# Patient Record
Sex: FEMALE | Race: BLACK OR AFRICAN AMERICAN | NOT HISPANIC OR LATINO | Employment: FULL TIME | URBAN - METROPOLITAN AREA
[De-identification: names, ages, dates, MRNs, and addresses within clinical notes are randomized per-mention and may not be internally consistent; named-entity substitution may affect disease eponyms.]

---

## 2018-01-31 ENCOUNTER — OFFICE VISIT CONVERTED (OUTPATIENT)
Dept: ORTHOPEDIC SURGERY | Facility: CLINIC | Age: 58
End: 2018-01-31
Attending: PHYSICIAN ASSISTANT

## 2018-04-11 ENCOUNTER — OFFICE VISIT CONVERTED (OUTPATIENT)
Dept: ORTHOPEDIC SURGERY | Facility: CLINIC | Age: 58
End: 2018-04-11
Attending: PHYSICIAN ASSISTANT

## 2018-05-07 ENCOUNTER — CONVERSION ENCOUNTER (OUTPATIENT)
Dept: ORTHOPEDIC SURGERY | Facility: CLINIC | Age: 58
End: 2018-05-07

## 2018-05-07 ENCOUNTER — OFFICE VISIT CONVERTED (OUTPATIENT)
Dept: ORTHOPEDIC SURGERY | Facility: CLINIC | Age: 58
End: 2018-05-07
Attending: ORTHOPAEDIC SURGERY

## 2018-06-04 ENCOUNTER — OFFICE VISIT CONVERTED (OUTPATIENT)
Dept: ORTHOPEDIC SURGERY | Facility: CLINIC | Age: 58
End: 2018-06-04
Attending: ORTHOPAEDIC SURGERY

## 2018-07-02 ENCOUNTER — OFFICE VISIT CONVERTED (OUTPATIENT)
Dept: ORTHOPEDIC SURGERY | Facility: CLINIC | Age: 58
End: 2018-07-02
Attending: ORTHOPAEDIC SURGERY

## 2018-08-01 ENCOUNTER — OFFICE VISIT CONVERTED (OUTPATIENT)
Dept: ORTHOPEDIC SURGERY | Facility: CLINIC | Age: 58
End: 2018-08-01
Attending: PHYSICIAN ASSISTANT

## 2018-09-10 ENCOUNTER — OFFICE VISIT CONVERTED (OUTPATIENT)
Dept: ORTHOPEDIC SURGERY | Facility: CLINIC | Age: 58
End: 2018-09-10
Attending: ORTHOPAEDIC SURGERY

## 2018-12-10 ENCOUNTER — CONVERSION ENCOUNTER (OUTPATIENT)
Dept: ORTHOPEDIC SURGERY | Facility: CLINIC | Age: 58
End: 2018-12-10

## 2018-12-10 ENCOUNTER — OFFICE VISIT CONVERTED (OUTPATIENT)
Dept: ORTHOPEDIC SURGERY | Facility: CLINIC | Age: 58
End: 2018-12-10
Attending: ORTHOPAEDIC SURGERY

## 2019-04-08 ENCOUNTER — OFFICE VISIT CONVERTED (OUTPATIENT)
Dept: ORTHOPEDIC SURGERY | Facility: CLINIC | Age: 59
End: 2019-04-08
Attending: ORTHOPAEDIC SURGERY

## 2019-04-08 ENCOUNTER — CONVERSION ENCOUNTER (OUTPATIENT)
Dept: ORTHOPEDIC SURGERY | Facility: CLINIC | Age: 59
End: 2019-04-08

## 2019-06-10 ENCOUNTER — OFFICE VISIT CONVERTED (OUTPATIENT)
Dept: ORTHOPEDIC SURGERY | Facility: CLINIC | Age: 59
End: 2019-06-10
Attending: ORTHOPAEDIC SURGERY

## 2019-08-19 ENCOUNTER — CONVERSION ENCOUNTER (OUTPATIENT)
Dept: ORTHOPEDIC SURGERY | Facility: CLINIC | Age: 59
End: 2019-08-19

## 2019-08-19 ENCOUNTER — OFFICE VISIT CONVERTED (OUTPATIENT)
Dept: ORTHOPEDIC SURGERY | Facility: CLINIC | Age: 59
End: 2019-08-19
Attending: ORTHOPAEDIC SURGERY

## 2019-09-16 ENCOUNTER — OFFICE VISIT CONVERTED (OUTPATIENT)
Dept: ORTHOPEDIC SURGERY | Facility: CLINIC | Age: 59
End: 2019-09-16
Attending: ORTHOPAEDIC SURGERY

## 2020-01-13 ENCOUNTER — OFFICE VISIT CONVERTED (OUTPATIENT)
Dept: ORTHOPEDIC SURGERY | Facility: CLINIC | Age: 60
End: 2020-01-13
Attending: ORTHOPAEDIC SURGERY

## 2020-09-21 ENCOUNTER — OFFICE VISIT CONVERTED (OUTPATIENT)
Dept: ORTHOPEDIC SURGERY | Facility: CLINIC | Age: 60
End: 2020-09-21
Attending: ORTHOPAEDIC SURGERY

## 2020-11-02 ENCOUNTER — OFFICE VISIT CONVERTED (OUTPATIENT)
Dept: ORTHOPEDIC SURGERY | Facility: CLINIC | Age: 60
End: 2020-11-02
Attending: ORTHOPAEDIC SURGERY

## 2021-03-01 ENCOUNTER — OFFICE VISIT CONVERTED (OUTPATIENT)
Dept: ORTHOPEDIC SURGERY | Facility: CLINIC | Age: 61
End: 2021-03-01
Attending: ORTHOPAEDIC SURGERY

## 2021-03-01 ENCOUNTER — CONVERSION ENCOUNTER (OUTPATIENT)
Dept: ORTHOPEDIC SURGERY | Facility: CLINIC | Age: 61
End: 2021-03-01

## 2021-05-05 ENCOUNTER — CONVERSION ENCOUNTER (OUTPATIENT)
Dept: ORTHOPEDIC SURGERY | Facility: CLINIC | Age: 61
End: 2021-05-05

## 2021-05-13 NOTE — PROGRESS NOTES
Progress Note      Patient Name: Jessika Mccann   Patient ID: [id number]   Sex: Female   YOB: 1960        Visit Date: September 21, 2020    Provider: Joshua Forrest MD   Location: [facility]   Location Address: [address] [place] [address]  [id number]   Location Phone: [phone]          Chief Complaint  · Left Knee Osteoarthritis      History Of Present Illness  Jessika Mccann is a 59 year old /Black female who presents today to [facility].      The patient presents here today for follow up evaluation of her left knee, Left Knee osteoarthritis.  Patient has a history of left knee arthroscopic partial medial meniscectomy, chondroplasty of medial femoral condyle, chondroplasty of the patella on 07/09/2018. The patient had a previous steroid injection preformed by Dr. Forrest in January 2020 in her left knee after seeing Dr. Brown for a second opinion. She is trying to hold off on a left total knee arthroplasty.  She is here today for a repeat injection in her left knee.       Past Medical History  Aftercare Following Left Knee Partial Medial Meniscectomy and Chondroplasty 07/19/2018; Anemia, Unspecified; Arthritis; Asthma; Diabetes; Heart Disease; Hyperlipemia; Hyperlipidemia; Hypertension; Left knee patella tendonitis; Limb Swelling; Medial meniscus tear, Left; Primary osteoarthritis of left knee; Reflux; Seasonal allergies; Shortness of Breath; Thyroid disorder         Past Surgical History  *Other; Carpal Tunnel Release; Colonoscopy; EAR Surgery; Heart Valves         Medication List  Aspir-81 81 mg oral tablet,delayed release (DR/EC); esomeprazole magnesium 40 mg oral capsule,delayed release(DR/EC); levothyroxine 100 mcg oral tablet; loratadine 10 mg oral tablet; metoprolol succinate 25 mg oral tablet extended release 24 hr; simvastatin 40 mg oral tablet; tramadol 50 mg oral tablet         Allergy List  NO KNOWN DRUG ALLERGIES       Allergies  "Reconciled  Family Medical History  Stroke; Heart Disease; Cancer, Unspecified; Diabetes, unspecified type; Family history of certain chronic disabling diseases; arthritis; Osteoporosis; Family history of Arthritis         Social History  Alcohol Use (Current some day); lives alone; lives with other; Recreational Drug Use (Never); Single.; Tobacco (Never); Working         Review of Systems  · Constitutional  o Denies  o : fever, chills, weight loss  · Cardiovascular  o Denies  o : chest pain, shortness of breath  · Gastrointestinal  o Denies  o : liver disease, heartburn, nausea, blood in stools  · Genitourinary  o Denies  o : painful urination, blood in urine  · Integument  o Denies  o : rash, itching  · Neurologic  o Denies  o : headache, weakness, loss of consciousness  · Musculoskeletal  o Denies  o : painful, swollen joints  · Psychiatric  o Denies  o : drug/alcohol addiction, anxiety, depression      Vitals  Date Time BP Position Site L\R Cuff Size HR RR TEMP (F) WT  HT  BMI kg/m2 BSA m2 O2 Sat        09/21/2020 09:27 AM         184lbs 16oz 5'  1.5\" 34.39 1.91           Physical Examination  · Constitutional  o Appearance  o : well developed, well-nourished, no obvious deformities present  · Head and Face  o Head  o :   § Inspection  § : normocephalic  o Face  o :   § Inspection  § : no facial lesions  · Eyes  o Conjunctivae  o : conjunctivae normal  o Sclerae  o : sclerae white  · Ears, Nose, Mouth and Throat  o Ears  o :   § External Ears  § : appearance within normal limits  § Hearing  § : intact  o Nose  o :   § External Nose  § : appearance normal  · Neck  o Inspection/Palpation  o : normal appearance  o Range of Motion  o : full range of motion  · Respiratory  o Respiratory Effort  o : breathing unlabored  o Inspection of Chest  o : normal appearance  o Auscultation of Lungs  o : no audible wheezing or rales  · Cardiovascular  o Heart  o : regular rate  · Gastrointestinal  o Abdominal Examination  o : " soft and non-tender  · Skin and Subcutaneous Tissue  o General Inspection  o : intact, no rashes  · Psychiatric  o General  o : Alert and oriented x3  o Judgement and Insight  o : judgment and insight intact  o Mood and Affect  o : mood normal, affect appropriate  · Left Knee  o Inspection  o : She has no skin discoloration, atrophy or swelling. Palpable tenderness over the medial and lateral joint line. She has full extension. Full flexion. Positive crepitus. Calf supple, non-tender. Neurovascularly and sensation grossly intact. X-rays showed advanced osteoarthritis of the left knee.  · Injection Note/Aspiration Note  o Site  o : left knee   o Procedure  o : Procedure: After educating the patient, patient gave consent for procedure. After using Chloraprep, the joint space was injected. The patient tolerated the procedure well.   o Medication  o : 80 mg of DepoMedrol with 9cc of 1% Lidocaine          Assessment  · Primary osteoarthritis of left knee     715.16/M17.12      Plan  · Orders  o Depo-Medrol injection 80mg () - 715.16/M17.12 - 09/21/2020   Lot [id number] Exp 07 2021 [employer] Administered by GREGG Forrest MD  o Knee Intra-articular Injection without US Guidance [facility] ([id number]) - 715.16/M17.12 - 09/21/2020   Lot [id number] Exp 08 01 2021 [employer] Administered by GREGG Forrest MD  · Medications  o Medications have been Reconciled  o Transition of Care or Provider Policy  · Instructions  o X-rays reviewed by Dr. Forrest.  o Reviewed the patient's Past Medical, Social, and Family history as well as the ROS at today's visit, no changes.  o Call or return if worsening symptoms.  o Follow up in 6 weeks.  o This note was transcribed by Anjali Fung. netta rosales Discussed the risks and benefits of a steroid injection in the left knee with the patient. The patient expressed understanding and wished to proceed with the injection. She tolerated the injection well. Follow up in 6 weeks to recheck.   o Electronically  Identified Patient Education Materials Provided Electronically            Electronically Signed by: Anjali Fung MA -Author on September 23, 2020 11:01:30 AM  Electronically Co-signed by: Joshua Forrest MD -Reviewer on September 24, 2020 05:19:12 PM

## 2021-05-13 NOTE — PROGRESS NOTES
Progress Note      Patient Name: Jessika Mccann   Patient ID: [id number]   Sex: Female   YOB: 1960    Referring Provider: Joshua Forrest MD    Visit Date: November 2, 2020    Provider: Joshua Forrest MD   Location[facility]   Location Address: [address] [place] [address]  [id number]   Location Phone: [phone]          Chief Complaint  · Left Knee Pain      History Of Present Illness  Jessika Mccann is a 60 year old /Black female who presents today to [facility].      Patient presents today with a follow-up of left knee pain. Patient has a history of left knee osteoarthritis. Patient has a history of left knee arthroscopic partial medial meniscectomy, chondroplasty of medial femoral condyle, chondroplasty of the patella on 07/09/2018. The patient had a previous steroid injection performed by Dr. Forrest in 9/21/20 in her left knee. Patient states the injections aren't giving her any relief of pain. She has been trying to hold off on a left total knee arthroplasty.       Past Medical History  Aftercare Following Left Knee Partial Medial Meniscectomy and Chondroplasty 07/19/2018; Anemia, Unspecified; Arthritis; Asthma; Diabetes; Heart Disease; Hyperlipemia; Hyperlipidemia; Hypertension; Left knee patella tendonitis; Limb Swelling; Medial meniscus tear, Left; Primary osteoarthritis of left knee; Reflux; Seasonal allergies; Shortness of Breath; Thyroid disorder         Past Surgical History  *Other; Carpal Tunnel Release; Colonoscopy; EAR Surgery; Heart Valves         Medication List  Aspir-81 81 mg oral tablet,delayed release (DR/EC); esomeprazole magnesium 40 mg oral capsule,delayed release(DR/EC); levothyroxine 100 mcg oral tablet; loratadine 10 mg oral tablet; metoprolol succinate 25 mg oral tablet extended release 24 hr; simvastatin 40 mg oral tablet; tramadol 50 mg oral tablet         Allergy List  NO KNOWN DRUG ALLERGIES       Allergies  "Reconciled  Family Medical History  Stroke; Heart Disease; Cancer, Unspecified; Diabetes, unspecified type; Family history of certain chronic disabling diseases; arthritis; Osteoporosis; Family history of Arthritis         Social History  Alcohol Use (Current some day); lives alone; lives with other; Recreational Drug Use (Never); Single.; Tobacco (Never); Working         Review of Systems  · Constitutional  o Denies  o : fever, chills, weight loss  · Cardiovascular  o Denies  o : chest pain, shortness of breath  · Gastrointestinal  o Denies  o : liver disease, heartburn, nausea, blood in stools  · Genitourinary  o Denies  o : painful urination, blood in urine  · Integument  o Denies  o : rash, itching  · Neurologic  o Denies  o : headache, weakness, loss of consciousness  · Musculoskeletal  o Denies  o : painful, swollen joints  · Psychiatric  o Denies  o : drug/alcohol addiction, anxiety, depression      Vitals  Date Time BP Position Site L\R Cuff Size HR RR TEMP (F) WT  HT  BMI kg/m2 BSA m2 O2 Sat FR L/min FiO2        11/02/2020 08:29 AM      67 - R   187lbs 0oz 5'  1.5\" 34.76 1.92 97 %            Physical Examination  · Constitutional  o Appearance  o : well developed, well-nourished, no obvious deformities present  · Head and Face  o Head  o :   § Inspection  § : normocephalic  o Face  o :   § Inspection  § : no facial lesions  · Eyes  o Conjunctivae  o : conjunctivae normal  o Sclerae  o : sclerae white  · Ears, Nose, Mouth and Throat  o Ears  o :   § External Ears  § : appearance within normal limits  § Hearing  § : intact  o Nose  o :   § External Nose  § : appearance normal  · Neck  o Inspection/Palpation  o : normal appearance  o Range of Motion  o : full range of motion  · Respiratory  o Respiratory Effort  o : breathing unlabored  o Inspection of Chest  o : normal appearance  o Auscultation of Lungs  o : no audible wheezing or rales  · Cardiovascular  o Heart  o : regular " rate  · Gastrointestinal  o Abdominal Examination  o : soft and non-tender  · Skin and Subcutaneous Tissue  o General Inspection  o : intact, no rashes  · Psychiatric  o General  o : Alert and oriented x3  o Judgement and Insight  o : judgment and insight intact  o Mood and Affect  o : mood normal, affect appropriate  · Left Knee  o Inspection  o : Sensation grossly intact. Neurovascular intact. Pulses are pleasant. Tenderness over the medial and lateral joint line. Full extension. Full flexion. No skin discoloration, atrophy or swelling. Crepitus present. Calf supple, non-tender. Full weight-bearing. Skin intact. Antalgic gait.   · In Office Procedures  o View  o : LAT/SUNRISE/STANDING   o Site  o : left, knee  o Indication  o : Left knee pain   o Study  o : X-rays ordered, taken in the office, and reviewed today.  o Xray  o : Negative signs for fracture or dislocation. Advanced degenerative changes consistent with osteoarthritis.           Assessment  · Primary osteoarthritis of left knee     715.16/M17.12  · Left knee pain, unspecified chronicity     719.46/M25.562      Plan  · Orders  o Knee (Left) [facility]erred View ([id number]) - 719.46/M25.562 - 11/02/2020  · Medications  o Medications have been Reconciled  o Transition of Care or Provider Policy  · Instructions  o Dr. Forrest saw and examined the patient and agrees with plan.   o X-rays reviewed by Dr. Forrest.  o Reviewed the patient's Past Medical, Social, and Family history as well as the ROS at today's visit, no changes.  o Call or return if worsening symptoms.  o Follow Up PRN.  o This note was transcribed by Lisa Calderon. ruiz  o Discussed diagnosis and treatment options with the patient. Discussed left total knee replacement. Patient wants to hold off on left total knee replacement for the time being and wants to lose some more weight.            Electronically Signed by: Lisa Calderon-, Other -Author on November 5, 2020 09:01:23  AM  Electronically Co-signed by: Joshua Forrest MD -Reviewer on November 5, 2020 08:14:48 PM

## 2021-05-14 VITALS — BODY MASS INDEX: 34.36 KG/M2 | HEIGHT: 61 IN | OXYGEN SATURATION: 97 % | HEART RATE: 64 BPM | WEIGHT: 182 LBS

## 2021-05-14 VITALS — WEIGHT: 185 LBS | HEIGHT: 61 IN | BODY MASS INDEX: 34.93 KG/M2

## 2021-05-14 VITALS — HEART RATE: 67 BPM | BODY MASS INDEX: 35.3 KG/M2 | OXYGEN SATURATION: 97 % | HEIGHT: 61 IN | WEIGHT: 187 LBS

## 2021-05-14 NOTE — PROGRESS NOTES
Progress Note      Patient Name: Jessika Mccann   Patient ID: [id number]   Sex: Female   YOB: 1960        Visit Date: March 1, 2021    Provider: Joshua Forrest MD   Location: [facility]   Location Address: [address] [place] [address]  [id number]   Location Phone: [phone]          Chief Complaint  · Left Knee Osteoarthritis      History Of Present Illness  Jessika Mccann is a 60 year old /Black female who presents today to [facility].      Patient presents today with a follow-up of left knee osteoarthritis. Patient has a history of left knee arthroscopic partial medial meniscectomy, chondroplasty of medial femoral condyle, chondroplasty of the patella on 07/09/2018. The patient had a previous steroid injection performed by Dr. Forrest in 9/21/20 in her left knee. Patient states the injections aren't giving her any relief of pain. She has been trying to hold off on a left total knee arthroplasty.       Past Medical History  Aftercare Following Left Knee Partial Medial Meniscectomy and Chondroplasty 07/19/2018; Anemia, Unspecified; Arthritis; Asthma; Diabetes; Heart Disease; Hyperlipemia; Hyperlipidemia; Hypertension; Left knee patella tendonitis; Limb Swelling; Medial meniscus tear, Left; Primary osteoarthritis of left knee; Reflux; Seasonal allergies; Shortness of Breath; Thyroid disorder         Past Surgical History  *Other; Carpal Tunnel Release; Colonoscopy; EAR Surgery; Heart Valves         Medication List  Aspir-81 81 mg oral tablet,delayed release (DR/EC); esomeprazole magnesium 40 mg oral capsule,delayed release(DR/EC); levothyroxine 100 mcg oral tablet; loratadine 10 mg oral tablet; metoprolol succinate 25 mg oral tablet extended release 24 hr; simvastatin 40 mg oral tablet; tramadol 50 mg oral tablet         Allergy List  NO KNOWN DRUG ALLERGIES       Allergies Reconciled  Family Medical History  Stroke; Heart Disease; Cancer, Unspecified;  "Diabetes, unspecified type; Family history of certain chronic disabling diseases; arthritis; Osteoporosis; Family history of Arthritis         Social History  Alcohol Use (Current some day); lives alone; lives with other; Recreational Drug Use (Never); Single.; Tobacco (Never); Working         Review of Systems  · Constitutional  o Denies  o : fever, chills, weight loss  · Cardiovascular  o Denies  o : chest pain, shortness of breath  · Gastrointestinal  o Denies  o : liver disease, heartburn, nausea, blood in stools  · Genitourinary  o Denies  o : painful urination, blood in urine  · Integument  o Denies  o : rash, itching  · Neurologic  o Denies  o : headache, weakness, loss of consciousness  · Musculoskeletal  o Denies  o : painful, swollen joints  · Psychiatric  o Denies  o : drug/alcohol addiction, anxiety, depression      Vitals  Date Time BP Position Site L\R Cuff Size HR RR TEMP (F) WT  HT  BMI kg/m2 BSA m2 O2 Sat FR L/min FiO2        03/01/2021 09:04 AM      64 - R   182lbs 0oz 5'  1.5\" 33.83 1.89 97 %            Physical Examination  · Constitutional  o Appearance  o : well developed, well-nourished, no obvious deformities present  · Head and Face  o Head  o :   § Inspection  § : normocephalic  o Face  o :   § Inspection  § : no facial lesions  · Eyes  o Conjunctivae  o : conjunctivae normal  o Sclerae  o : sclerae white  · Ears, Nose, Mouth and Throat  o Ears  o :   § External Ears  § : appearance within normal limits  § Hearing  § : intact  o Nose  o :   § External Nose  § : appearance normal  · Neck  o Inspection/Palpation  o : normal appearance  o Range of Motion  o : full range of motion  · Respiratory  o Respiratory Effort  o : breathing unlabored  o Inspection of Chest  o : normal appearance  o Auscultation of Lungs  o : no audible wheezing or rales  · Cardiovascular  o Heart  o : regular rate  · Gastrointestinal  o Abdominal Examination  o : soft and non-tender  · Skin and Subcutaneous " Tissue  o General Inspection  o : intact, no rashes  · Psychiatric  o General  o : Alert and oriented x3  o Judgement and Insight  o : judgment and insight intact  o Mood and Affect  o : mood normal, affect appropriate  · Left Knee  o Inspection  o : Sensation grossly intact. Neurovascular intact. Pulses are pleasant. Tenderness over the medial and lateral joint line. Full extension. Full flexion. No skin discoloration, atrophy or swelling. Crepitus present. Calf supple, non-tender. Full weight-bearing. Skin intact. Antalgic gait.   · Injection Note/Aspiration Note  o Site  o : left knee   o Procedure  o : Procedure: After educating the patient, patient gave consent for procedure. After using Chloraprep, the joint space was injected. The patient tolerated the procedure well.   o Medication  o : 80 mg of DepoMedrol with 9cc of 1% Lidocaine          Assessment  · Primary osteoarthritis of left knee     715.16/M17.12      Plan  · Orders  o Depo-Medrol injection 80mg () - 715.16/M17.12 - 03/01/2021   Lot [id number] Exp 01 2022 [employer] Administered by GREGG Forrest MD  o Knee Intra-articular Injection without US Guidance [facility] ([id number]) - 715.16/M17.12 - 03/01/2021   Lot [id number] 03 01 2022 [employer] Administered by GREGG Forrest MD  · Medications  o Medications have been Reconciled  o Transition of Care or Provider Policy  · Instructions  o Dr. Forrest saw and examined the patient and agrees with plan.   o Reviewed the patient's Past Medical, Social, and Family history as well as the ROS at today's visit, no changes.  o Call or return if worsening symptoms.  o Follow Up PRN.  o The above service was scribed by Lisa Calderon on my behalf and I attest to the accuracy of the note. ruiz   o Discussed diagnosis and treatment options with the patient. Patient opted for a left knee injection and tolerated it well. She is holding off on a left knee replacement.            Electronically Signed by: Lisa Calderon-,  Other -Author on March 4, 2021 09:17:52 AM  Electronically Co-signed by: Joshua Forrest MD -Reviewer on March 7, 2021 09:47:29 AM

## 2021-05-15 VITALS — OXYGEN SATURATION: 97 % | WEIGHT: 188.25 LBS | HEART RATE: 86 BPM | BODY MASS INDEX: 35.54 KG/M2 | HEIGHT: 61 IN

## 2021-05-15 VITALS — HEIGHT: 61 IN | WEIGHT: 181 LBS | BODY MASS INDEX: 34.17 KG/M2

## 2021-05-15 VITALS — OXYGEN SATURATION: 98 % | HEART RATE: 72 BPM | BODY MASS INDEX: 34.17 KG/M2 | WEIGHT: 181 LBS | HEIGHT: 61 IN

## 2021-05-15 VITALS — HEIGHT: 61 IN | BODY MASS INDEX: 35.56 KG/M2 | OXYGEN SATURATION: 98 % | HEART RATE: 84 BPM | WEIGHT: 188.37 LBS

## 2021-05-15 VITALS — HEART RATE: 101 BPM | BODY MASS INDEX: 33.99 KG/M2 | OXYGEN SATURATION: 99 % | WEIGHT: 180 LBS | HEIGHT: 61 IN

## 2021-05-16 VITALS — HEIGHT: 61 IN | WEIGHT: 173 LBS | BODY MASS INDEX: 32.66 KG/M2 | OXYGEN SATURATION: 97 % | HEART RATE: 71 BPM

## 2021-05-16 VITALS — HEIGHT: 61 IN | HEART RATE: 84 BPM | OXYGEN SATURATION: 96 % | BODY MASS INDEX: 35.87 KG/M2 | WEIGHT: 190 LBS

## 2021-05-16 VITALS — HEART RATE: 84 BPM | WEIGHT: 185 LBS | OXYGEN SATURATION: 98 % | HEIGHT: 61 IN | BODY MASS INDEX: 34.93 KG/M2

## 2021-05-16 VITALS — HEIGHT: 61 IN | HEART RATE: 82 BPM | OXYGEN SATURATION: 98 % | WEIGHT: 175 LBS | BODY MASS INDEX: 33.04 KG/M2

## 2021-05-16 VITALS — BODY MASS INDEX: 33.49 KG/M2 | WEIGHT: 177.37 LBS | OXYGEN SATURATION: 98 % | HEIGHT: 61 IN | HEART RATE: 99 BPM

## 2021-05-16 VITALS — HEIGHT: 61 IN | WEIGHT: 173 LBS | BODY MASS INDEX: 32.66 KG/M2

## 2021-05-16 VITALS — WEIGHT: 179 LBS | OXYGEN SATURATION: 97 % | BODY MASS INDEX: 33.79 KG/M2 | HEART RATE: 72 BPM | HEIGHT: 61 IN

## 2021-05-16 VITALS — BODY MASS INDEX: 32.66 KG/M2 | HEIGHT: 61 IN | WEIGHT: 173 LBS

## 2021-07-15 VITALS — BODY MASS INDEX: 31.51 KG/M2 | HEART RATE: 80 BPM | HEIGHT: 65 IN | OXYGEN SATURATION: 97 % | WEIGHT: 189.12 LBS

## 2021-11-08 ENCOUNTER — OFFICE VISIT (OUTPATIENT)
Dept: ORTHOPEDIC SURGERY | Facility: CLINIC | Age: 61
End: 2021-11-08

## 2021-11-08 VITALS — WEIGHT: 190 LBS | HEART RATE: 67 BPM | BODY MASS INDEX: 31.65 KG/M2 | HEIGHT: 65 IN | OXYGEN SATURATION: 97 %

## 2021-11-08 DIAGNOSIS — M25.562 LEFT KNEE PAIN, UNSPECIFIED CHRONICITY: ICD-10-CM

## 2021-11-08 DIAGNOSIS — M17.12 PRIMARY OSTEOARTHRITIS OF LEFT KNEE: Primary | ICD-10-CM

## 2021-11-08 PROCEDURE — 20610 DRAIN/INJ JOINT/BURSA W/O US: CPT | Performed by: ORTHOPAEDIC SURGERY

## 2021-11-08 RX ORDER — VERAPAMIL HYDROCHLORIDE 300 MG/1
CAPSULE, EXTENDED RELEASE ORAL
COMMUNITY
Start: 2021-10-25

## 2021-11-08 RX ORDER — MECLIZINE HYDROCHLORIDE 25 MG/1
TABLET ORAL
COMMUNITY
Start: 2021-09-06

## 2021-11-08 RX ORDER — LORATADINE 10 MG/1
TABLET ORAL
COMMUNITY

## 2021-11-08 RX ORDER — ASPIRIN 81 MG/1
TABLET ORAL
COMMUNITY

## 2021-11-08 RX ORDER — FLUTICASONE PROPIONATE 50 MCG
SPRAY, SUSPENSION (ML) NASAL
COMMUNITY
Start: 2021-09-13

## 2021-11-08 RX ORDER — METOPROLOL SUCCINATE 25 MG/1
TABLET, EXTENDED RELEASE ORAL
COMMUNITY

## 2021-11-08 RX ORDER — SIMVASTATIN 40 MG
TABLET ORAL
COMMUNITY

## 2021-11-08 RX ORDER — LEVOTHYROXINE SODIUM 0.1 MG/1
100 TABLET ORAL DAILY
COMMUNITY
Start: 2021-10-08

## 2021-11-08 RX ORDER — CITALOPRAM 20 MG/1
TABLET ORAL
COMMUNITY
Start: 2021-09-02

## 2021-11-08 RX ORDER — ESOMEPRAZOLE MAGNESIUM 40 MG/1
CAPSULE, DELAYED RELEASE ORAL
COMMUNITY

## 2021-11-08 RX ORDER — ALBUTEROL SULFATE 90 UG/1
2 AEROSOL, METERED RESPIRATORY (INHALATION) SEE ADMIN INSTRUCTIONS
COMMUNITY
Start: 2021-08-30

## 2021-11-08 RX ADMIN — LIDOCAINE HYDROCHLORIDE 9 ML: 10 INJECTION, SOLUTION INFILTRATION; PERINEURAL at 09:37

## 2021-11-08 RX ADMIN — TRIAMCINOLONE ACETONIDE 40 MG: 40 INJECTION, SUSPENSION INTRA-ARTICULAR; INTRAMUSCULAR at 09:37

## 2021-11-08 NOTE — PROGRESS NOTES
"Chief Complaint  Pain of the Left Knee     Subjective      Jessika Mccann presents to [facility] for an evaluation of left knee. Patient has left knee osteoarthritis that she has been treating conservatively. She states that she wants to do a knee replacement in the Spring. She has had injections in the past that has given her relief. She denies any recent injury to the knee.     No Known Allergies     Social History     Socioeconomic History   • Marital status: Single   Tobacco Use   • Smoking status: Never Smoker   • Smokeless tobacco: Never Used        Review of Systems     Objective   Vital Signs:   Pulse 67   Ht 165.1 cm (65\")   Wt 86.2 kg (190 lb)   SpO2 97%   BMI 31.62 kg/m²       Physical Exam  Constitutional:       Appearance: Normal appearance. Patient is well-developed and normal weight.   HENT:      Head: Normocephalic.      Right Ear: Hearing and external ear normal.      Left Ear: Hearing and external ear normal.      Nose: Nose normal.   Eyes:      Conjunctiva/sclera: Conjunctivae normal.   Cardiovascular:      Rate and Rhythm: Normal rate.   Pulmonary:      Effort: Pulmonary effort is normal.      Breath sounds: No wheezing or rales.   Abdominal:      Palpations: Abdomen is soft.      Tenderness: There is no abdominal tenderness.   Musculoskeletal:      Cervical back: Normal range of motion.   Skin:     Findings: No rash.   Neurological:      Mental Status: Patient  is alert and oriented to person, place, and time.   Psychiatric:         Mood and Affect: Mood and affect normal.         Judgment: Judgment normal.       Ortho Exam      LEFT KNEE: positive crepitus. Palpable osteophytes. No swelling, skin discoloration or atrophy. Skin intact. Tender medial and lateral joint line. Full extension. Stable to varus/valgus stress. Negative Lachman. Sensation grossly intact. Neurovascular intact.  Dorsal Pedal Pulse 2+, posterior tibialis pulse 2+. Calf supple, non-tender, " no signs of DVT. Full weight bearing. Limping gait.       Large Joint Arthrocentesis: L knee  Date/Time: 11/8/2021 9:37 AM  Consent given by: patient  Site marked: site marked  Timeout: Immediately prior to procedure a time out was called to verify the correct patient, procedure, equipment, support staff and site/side marked as required   Supporting Documentation  Indications: pain   Procedure Details  Location: knee - L knee  Preparation: Patient was prepped and draped in the usual sterile fashion  Needle size: 22 G  Medications administered: 9 mL lidocaine 1 %; 40 mg triamcinolone acetonide 40 MG/ML  Patient tolerance: patient tolerated the procedure well with no immediate complications              Imaging Results (Most Recent)     Procedure Component Value Units Date/Time    XR Knee 3 View Left [[id number]] Resulted: 11/08/21 0910     Updated: 11/08/21 0917           Result Review :     X-Ray Report:  Left knee(s) X-Ray  Indication: Evaluation of left knee pain   AP, Lateral and Standing view(s)  Findings: Moderately advanced degenerative changes. No fracture or dislocation. Bone spurring in the medial compartment.   Prior studies available for comparison: no     Assessment and Plan     DX: Left knee osteoarthritis     Patient wants to proceed with a left total knee arthroplasty in the spring time. She wishes to continue conservative management. A left knee injection given, she tolerated this well.     Call or return if worsening symptoms.    Follow Up     PRN.       Patient was given instructions and counseling regarding her condition or for health maintenance advice. Please see specific information pulled into the AVS if appropriate.     Scribed for Joshua Forrest MD by Lisa Calderon.  11/08/21   09:19 EST    I have personally performed the services described in this document as scribed by the above individual and it is both accurate and complete. Joshua Forrest MD 11/10/21

## 2021-11-10 RX ORDER — TRIAMCINOLONE ACETONIDE 40 MG/ML
40 INJECTION, SUSPENSION INTRA-ARTICULAR; INTRAMUSCULAR
Status: COMPLETED | OUTPATIENT
Start: 2021-11-08 | End: 2021-11-08

## 2021-11-10 RX ORDER — LIDOCAINE HYDROCHLORIDE 10 MG/ML
9 INJECTION, SOLUTION INFILTRATION; PERINEURAL
Status: COMPLETED | OUTPATIENT
Start: 2021-11-08 | End: 2021-11-08

## 2022-06-23 ENCOUNTER — TELEPHONE (OUTPATIENT)
Dept: ORTHOPEDIC SURGERY | Facility: CLINIC | Age: 62
End: 2022-06-23

## 2022-06-23 NOTE — TELEPHONE ENCOUNTER
PT CAME IN TO ASK IF HER PAPERS THAT SHE FAXED -[phone] WERE RECEIVED. PT ASKING FOR A CALL BACK -[phone] TO CONFIRM THAT WE RECEIVED THEM.

## 2022-08-30 ENCOUNTER — TRANSCRIBE ORDERS (OUTPATIENT)
Dept: ADMINISTRATIVE | Facility: HOSPITAL | Age: 62
End: 2022-08-30

## 2022-08-30 DIAGNOSIS — N60.11 FIBROCYSTIC BREAST CHANGES, BILATERAL: Primary | ICD-10-CM

## 2022-08-30 DIAGNOSIS — N60.12 FIBROCYSTIC BREAST CHANGES, BILATERAL: Primary | ICD-10-CM

## 2022-08-31 ENCOUNTER — TRANSCRIBE ORDERS (OUTPATIENT)
Dept: ADMINISTRATIVE | Facility: HOSPITAL | Age: 62
End: 2022-08-31

## 2022-08-31 DIAGNOSIS — N63.20 MASS OF LEFT BREAST, UNSPECIFIED QUADRANT: Primary | ICD-10-CM

## 2022-09-15 ENCOUNTER — HOSPITAL ENCOUNTER (OUTPATIENT)
Dept: ULTRASOUND IMAGING | Facility: HOSPITAL | Age: 62
Discharge: HOME | End: 2022-09-15

## 2022-09-15 ENCOUNTER — HOSPITAL ENCOUNTER (OUTPATIENT)
Dept: MAMMOGRAPHY | Facility: HOSPITAL | Age: 62
Discharge: HOME | End: 2022-09-15

## 2022-09-15 DIAGNOSIS — N60.11 FIBROCYSTIC BREAST CHANGES, BILATERAL: ICD-10-CM

## 2022-09-15 DIAGNOSIS — N60.12 FIBROCYSTIC BREAST CHANGES, BILATERAL: ICD-10-CM

## 2022-09-15 PROCEDURE — 76642 ULTRASOUND BREAST LIMITED: CPT

## 2022-09-15 PROCEDURE — G0279 TOMOSYNTHESIS, MAMMO: HCPCS

## 2022-09-15 PROCEDURE — 77066 DX MAMMO INCL CAD BI: CPT

## 2022-10-10 ENCOUNTER — APPOINTMENT (OUTPATIENT)
Dept: MAMMOGRAPHY | Facility: HOSPITAL | Age: 62
End: 2022-10-10

## 2023-02-02 ENCOUNTER — TRANSCRIBE ORDERS (OUTPATIENT)
Dept: ADMINISTRATIVE | Facility: HOSPITAL | Age: 63
End: 2023-02-02
Payer: COMMERCIAL

## 2023-02-02 DIAGNOSIS — Z12.31 VISIT FOR SCREENING MAMMOGRAM: Primary | ICD-10-CM

## 2023-03-23 ENCOUNTER — HOSPITAL ENCOUNTER (OUTPATIENT)
Dept: MAMMOGRAPHY | Facility: HOSPITAL | Age: 63
Discharge: HOME | End: 2023-03-23
Payer: COMMERCIAL

## 2023-03-23 DIAGNOSIS — Z12.31 VISIT FOR SCREENING MAMMOGRAM: ICD-10-CM

## 2023-05-05 ENCOUNTER — TRANSCRIBE ORDERS (OUTPATIENT)
Dept: ADMINISTRATIVE | Facility: HOSPITAL | Age: 63
End: 2023-05-05
Payer: COMMERCIAL

## 2023-05-05 DIAGNOSIS — Z12.31 VISIT FOR SCREENING MAMMOGRAM: Primary | ICD-10-CM

## 2023-10-02 ENCOUNTER — OFFICE VISIT (OUTPATIENT)
Dept: ORTHOPEDIC SURGERY | Facility: CLINIC | Age: 63
End: 2023-10-02
Payer: COMMERCIAL

## 2023-10-02 VITALS
SYSTOLIC BLOOD PRESSURE: 148 MMHG | HEIGHT: 65 IN | OXYGEN SATURATION: 99 % | WEIGHT: 190 LBS | HEART RATE: 64 BPM | DIASTOLIC BLOOD PRESSURE: 75 MMHG | BODY MASS INDEX: 31.65 KG/M2

## 2023-10-02 DIAGNOSIS — M25.562 LEFT KNEE PAIN, UNSPECIFIED CHRONICITY: Primary | ICD-10-CM

## 2023-10-02 DIAGNOSIS — M17.12 PRIMARY OSTEOARTHRITIS OF LEFT KNEE: ICD-10-CM

## 2023-10-02 RX ORDER — TRIAMCINOLONE ACETONIDE 40 MG/ML
40 INJECTION, SUSPENSION INTRA-ARTICULAR; INTRAMUSCULAR
Status: COMPLETED | OUTPATIENT
Start: 2023-10-02 | End: 2023-10-02

## 2023-10-02 RX ORDER — CETIRIZINE HYDROCHLORIDE 10 MG/1
10 TABLET ORAL DAILY
COMMUNITY

## 2023-10-02 RX ORDER — LIDOCAINE HYDROCHLORIDE 10 MG/ML
5 INJECTION, SOLUTION INFILTRATION; PERINEURAL
Status: COMPLETED | OUTPATIENT
Start: 2023-10-02 | End: 2023-10-02

## 2023-10-02 RX ORDER — SENNOSIDES 8.6 MG
650 CAPSULE ORAL
COMMUNITY

## 2023-10-02 RX ORDER — AZELASTINE 1 MG/ML
SPRAY, METERED NASAL
COMMUNITY
Start: 2023-07-10

## 2023-10-02 RX ADMIN — LIDOCAINE HYDROCHLORIDE 5 ML: 10 INJECTION, SOLUTION INFILTRATION; PERINEURAL at 10:49

## 2023-10-02 RX ADMIN — TRIAMCINOLONE ACETONIDE 40 MG: 40 INJECTION, SUSPENSION INTRA-ARTICULAR; INTRAMUSCULAR at 10:49

## 2023-10-02 NOTE — PROGRESS NOTES
"Chief Complaint  Follow-up of the Left Knee     Subjective      Jessika Mccann presents to [facility] for follow up of the left knee.  Due to the patients high blood pressure reading today, I advised the patient to contact their PCP.   Patient has a history of left knee arthroscopic partial medial meniscectomy, chondroplasty of medial femoral condyle, chondroplasty of the patella on 07/09/2018.  She has pain occasionally.  She notes her knee locks up every now and then.  She is wearing a knee brace for support and stability.  She has had injections in the past.      No Known Allergies     Social History     Socioeconomic History    Marital status: Single   Tobacco Use    Smoking status: Never    Smokeless tobacco: Never        I reviewed the patient's chief complaint, history of present illness, review of systems, past medical history, surgical history, family history, social history, medications, and allergy list.     Review of Systems     Constitutional: Denies fevers, chills, weight loss  Cardiovascular: Denies chest pain, shortness of breath  Skin: Denies rashes, acute skin changes  Neurologic: Denies headache, loss of consciousness        Vital Signs:   /75   Pulse 64   Ht 165.1 cm (65\")   Wt 86.2 kg (190 lb)   SpO2 99%   BMI 31.62 kg/m²          Physical Exam  General: Alert. No acute distress    Ortho Exam        LEFT KNEE Flexion 125. Extension 0. Stable to varus/valgus stress. Stable to anterior/posterior drawer. Neurovascularly intact. Negative Jeffrey. Negative Lachman. Positive EHL, FHL, HS and TA. Sensation intact to light touch all 5 nerves of the foot. Ambulates with Antalgic gait. Patella is well tracking. Calf supple, non-tender. Positive tenderness to the medial joint line. Negative tenderness to the lateral joint line. Negative Crepitus. Good strength to hamstrings, quadriceps, dorsiflexors, and plantar flexors.  Knee Extensor Mechanism " intact        Left knee: L knee  Date/Time: 10/2/2023 10:49 AM  Consent given by: patient  Site marked: site marked  Timeout: Immediately prior to procedure a time out was called to verify the correct patient, procedure, equipment, support staff and site/side marked as required   Supporting Documentation  Indications: pain   Procedure Details  Location: knee - L knee  Needle size: 22 G  Medications administered: 5 mL lidocaine 1 %; 40 mg triamcinolone acetonide 40 MG/ML  Patient tolerance: patient tolerated the procedure well with no immediate complications            Imaging Results (Most Recent)       Procedure Component Value Units Date/Time    XR Knee 3 View Left [[id number]] Resulted: 10/02/23 1019     Updated: 10/02/23 1031             Result Review :     X-Ray Report:  Left knee X-Ray  Indication: Evaluation of the left knee  AP/Lateral and [place] view(s)  Findings: Moderate arthritis.  History of meniscectomy stable.   Prior studies available for comparison: yes             Assessment and Plan     Diagnoses and all orders for this visit:    1. Left knee pain, unspecified chronicity (Primary)  -     XR Knee 3 View Left    2. Primary osteoarthritis of left knee        Discussed the treatment plan with the patient. I reviewed the X-rays that were obtained today with the patient.     Discussed the risks and benefits of conservative measures. The patient expressed understanding and wished to proceed with a left knee steroid injection.  She tolerated the injection well.         Call or return if worsening symptoms.    Follow Up     PRN      Patient was given instructions and counseling regarding her condition or for health maintenance advice. Please see specific information pulled into the AVS if appropriate.     Scribed for Joshua Forrest MD by Mabel Dunn MA.  10/02/23   10:18 EDT      I have personally performed the services described in this document as scribed by the above individual and it is both  accurate and complete. Joshua Forrest MD 10/02/23

## 2023-10-23 ENCOUNTER — HOSPITAL ENCOUNTER (OUTPATIENT)
Dept: MAMMOGRAPHY | Facility: HOSPITAL | Age: 63
Discharge: HOME | End: 2023-10-23
Admitting: FAMILY MEDICINE
Payer: COMMERCIAL

## 2023-10-23 DIAGNOSIS — Z12.31 VISIT FOR SCREENING MAMMOGRAM: ICD-10-CM

## 2023-10-23 PROCEDURE — 77063 BREAST TOMOSYNTHESIS BI: CPT

## 2023-10-23 PROCEDURE — 77067 SCR MAMMO BI INCL CAD: CPT

## 2023-10-24 ENCOUNTER — HOSPITAL ENCOUNTER (OUTPATIENT)
Dept: MAMMOGRAPHY | Facility: HOSPITAL | Age: 63
Discharge: HOME | End: 2023-10-24
Admitting: SURGERY
Payer: COMMERCIAL

## 2023-10-24 PROCEDURE — 77063 BREAST TOMOSYNTHESIS BI: CPT

## 2023-10-24 PROCEDURE — 77067 SCR MAMMO BI INCL CAD: CPT

## 2023-12-06 ENCOUNTER — TRANSCRIBE ORDERS (OUTPATIENT)
Dept: ADMINISTRATIVE | Facility: HOSPITAL | Age: 63
End: 2023-12-06
Payer: COMMERCIAL

## 2023-12-06 DIAGNOSIS — Z12.31 SCREENING MAMMOGRAM, ENCOUNTER FOR: Primary | ICD-10-CM

## 2024-03-25 ENCOUNTER — OFFICE VISIT (OUTPATIENT)
Dept: ORTHOPEDIC SURGERY | Facility: CLINIC | Age: 64
End: 2024-03-25
Payer: COMMERCIAL

## 2024-03-25 ENCOUNTER — TRANSCRIBE ORDERS (OUTPATIENT)
Dept: ADMINISTRATIVE | Facility: HOSPITAL | Age: 64
End: 2024-03-25
Payer: COMMERCIAL

## 2024-03-25 VITALS
DIASTOLIC BLOOD PRESSURE: 94 MMHG | OXYGEN SATURATION: 96 % | HEIGHT: 65 IN | BODY MASS INDEX: 31.65 KG/M2 | WEIGHT: 190 LBS | HEART RATE: 81 BPM | SYSTOLIC BLOOD PRESSURE: 162 MMHG

## 2024-03-25 DIAGNOSIS — M25.562 LEFT KNEE PAIN, UNSPECIFIED CHRONICITY: Primary | ICD-10-CM

## 2024-03-25 DIAGNOSIS — N63.20 MASS OF LEFT BREAST, UNSPECIFIED QUADRANT: Primary | ICD-10-CM

## 2024-03-25 DIAGNOSIS — M17.12 PRIMARY OSTEOARTHRITIS OF LEFT KNEE: ICD-10-CM

## 2024-03-25 DIAGNOSIS — M25.552 LEFT HIP PAIN: ICD-10-CM

## 2024-03-25 DIAGNOSIS — M17.12 OSTEOARTHRITIS OF LEFT KNEE, UNSPECIFIED OSTEOARTHRITIS TYPE: ICD-10-CM

## 2024-03-25 PROCEDURE — 99213 OFFICE O/P EST LOW 20 MIN: CPT | Performed by: ORTHOPAEDIC SURGERY

## 2024-03-25 PROCEDURE — 20610 DRAIN/INJ JOINT/BURSA W/O US: CPT | Performed by: ORTHOPAEDIC SURGERY

## 2024-03-25 RX ORDER — TRIAMCINOLONE ACETONIDE 40 MG/ML
40 INJECTION, SUSPENSION INTRA-ARTICULAR; INTRAMUSCULAR
Status: COMPLETED | OUTPATIENT
Start: 2024-03-25 | End: 2024-03-25

## 2024-03-25 RX ORDER — LIDOCAINE HYDROCHLORIDE 10 MG/ML
5 INJECTION, SOLUTION INFILTRATION; PERINEURAL
Status: COMPLETED | OUTPATIENT
Start: 2024-03-25 | End: 2024-03-25

## 2024-03-25 RX ADMIN — LIDOCAINE HYDROCHLORIDE 5 ML: 10 INJECTION, SOLUTION INFILTRATION; PERINEURAL at 14:19

## 2024-03-25 RX ADMIN — TRIAMCINOLONE ACETONIDE 40 MG: 40 INJECTION, SUSPENSION INTRA-ARTICULAR; INTRAMUSCULAR at 14:19

## 2024-03-25 NOTE — PROGRESS NOTES
"Chief Complaint  Follow-up of the Left Knee and Initial Evaluation of the Left Hip     Subjective      Jessika Mccann presents to [facility] for follow up of the left knee.  Patient has a history of left knee arthroscopic partial medial meniscectomy, chondroplasty of medial femoral condyle, chondroplasty of the patella on 07/09/2018.  She has pain occasionally.  She notes her knee locks up every now and then.  She is wearing a knee brace for support and stability.  She has had injections in the past.  She had a left knee steroid injection on 10/2/23. She has pain in the thigh, spasms in the back, hip and thigh.  She limps with walking.        No Known Allergies     Social History     Socioeconomic History    Marital status: Single   Tobacco Use    Smoking status: Never    Smokeless tobacco: Never   Vaping Use    Vaping status: Never Used        I reviewed the patient's chief complaint, history of present illness, review of systems, past medical history, surgical history, family history, social history, medications, and allergy list.     Review of Systems     Constitutional: Denies fevers, chills, weight loss  Cardiovascular: Denies chest pain, shortness of breath  Skin: Denies rashes, acute skin changes  Neurologic: Denies headache, loss of consciousness        Vital Signs:   /94   Pulse 81   Ht 165.1 cm (65\")   Wt 86.2 kg (190 lb)   SpO2 96%   BMI 31.62 kg/m²          Physical Exam  General: Alert. No acute distress    Ortho Exam        LEFT KNEE Flexion 120. Extension 0. Stable to varus/valgus stress. Stable to anterior/posterior drawer. Neurovascularly intact. Negative Jeffrey. Negative Lachman. Positive EHL, FHL, HS and TA. Sensation intact to light touch all 5 nerves of the foot. Ambulates with Antalgic gait. Patella is well tracking. Calf supple, non-tender. Positive tenderness to the medial joint line. Negative tenderness to the lateral joint line. Negative " Crepitus. Good strength to hamstrings, quadriceps, dorsiflexors, and plantar flexors.  Knee Extensor Mechanism intact         Large Joint Arthrocentesis: L knee  Date/Time: 3/25/2024 2:19 PM  Consent given by: patient  Site marked: site marked  Timeout: Immediately prior to procedure a time out was called to verify the correct patient, procedure, equipment, support staff and site/side marked as required   Supporting Documentation  Indications: pain   Procedure Details  Location: knee - L knee  Needle size: 22 G  Medications administered: 5 mL lidocaine 1 %; 40 mg triamcinolone acetonide 40 MG/ML  Patient tolerance: patient tolerated the procedure well with no immediate complications            Imaging Results (Most Recent)       Procedure Component Value Units Date/Time    XR Hip With or Without Pelvis 2 - 3 View Left [[id number]] Resulted: 03/25/24 1323     Updated: 03/25/24 1329    XR Knee 3 View Left [[id number]] Resulted: 03/25/24 1323     Updated: 03/25/24 1329             Result Review :     X-Ray Report:  Left knee X-Ray  Indication: Evaluation of the left knee  AP/Lateral and [place] view(s)  Findings: Severe arthritis bone on bone with patella femoral arthritis and the medial joint line.   Prior studies available for comparison: yes     X-Ray Report:  Left hip X-Ray  Indication: Evaluation of the left hip  AP/Lateral view(s)  Findings: Minimal arthritis. No fracture or dislocation.   Prior studies available for comparison: no             Assessment and Plan     Diagnoses and all orders for this visit:    1. Left knee pain, unspecified chronicity (Primary)  -     XR Knee 3 View Left    2. Primary osteoarthritis of left hip  -     XR Hip With or Without Pelvis 2 - 3 View Left    3. Osteoarthritis of left knee, unspecified osteoarthritis type  -     Large Joint Arthrocentesis: L knee    4. Left hip pain        Discussed the treatment plan with the patient. I reviewed the X-rays that were obtained today with the  patient.     Discussed the risks and benefits of conservative measures. The patient expressed understanding and wished to proceed with a left knee steroid injection.       Call or return if worsening symptoms.    Follow Up     PRN      Patient was given instructions and counseling regarding her condition or for health maintenance advice. Please see specific information pulled into the AVS if appropriate.     Scribed for Joshua Forrest MD by Mabel Dunn MA.  03/25/24   13:20 EDT    I have personally performed the services described in this document as scribed by the above individual and it is both accurate and complete. Joshua Forrest MD 03/25/24

## 2024-04-01 ENCOUNTER — HOSPITAL ENCOUNTER (OUTPATIENT)
Dept: MAMMOGRAPHY | Facility: HOSPITAL | Age: 64
Discharge: HOME | End: 2024-04-01
Payer: COMMERCIAL

## 2024-04-01 ENCOUNTER — HOSPITAL ENCOUNTER (OUTPATIENT)
Dept: ULTRASOUND IMAGING | Facility: HOSPITAL | Age: 64
Discharge: HOME | End: 2024-04-01
Payer: COMMERCIAL

## 2024-04-01 DIAGNOSIS — N63.20 MASS OF LEFT BREAST, UNSPECIFIED QUADRANT: ICD-10-CM

## 2024-04-01 PROCEDURE — 76642 ULTRASOUND BREAST LIMITED: CPT

## 2024-04-01 PROCEDURE — G0279 TOMOSYNTHESIS, MAMMO: HCPCS

## 2024-04-01 PROCEDURE — 77065 DX MAMMO INCL CAD UNI: CPT

## 2024-07-22 ENCOUNTER — OFFICE VISIT (OUTPATIENT)
Dept: ORTHOPEDIC SURGERY | Facility: CLINIC | Age: 64
End: 2024-07-22
Payer: COMMERCIAL

## 2024-07-22 VITALS
BODY MASS INDEX: 31.65 KG/M2 | SYSTOLIC BLOOD PRESSURE: 160 MMHG | OXYGEN SATURATION: 97 % | HEART RATE: 83 BPM | DIASTOLIC BLOOD PRESSURE: 95 MMHG | WEIGHT: 190 LBS | HEIGHT: 65 IN

## 2024-07-22 DIAGNOSIS — M17.12 OSTEOARTHRITIS OF LEFT KNEE, UNSPECIFIED OSTEOARTHRITIS TYPE: ICD-10-CM

## 2024-07-22 DIAGNOSIS — M25.562 LEFT KNEE PAIN, UNSPECIFIED CHRONICITY: Primary | ICD-10-CM

## 2024-07-22 PROCEDURE — 99213 OFFICE O/P EST LOW 20 MIN: CPT | Performed by: ORTHOPAEDIC SURGERY

## 2024-07-22 PROCEDURE — 20610 DRAIN/INJ JOINT/BURSA W/O US: CPT | Performed by: ORTHOPAEDIC SURGERY

## 2024-07-22 RX ORDER — TRIAMCINOLONE ACETONIDE 40 MG/ML
40 INJECTION, SUSPENSION INTRA-ARTICULAR; INTRAMUSCULAR
Status: COMPLETED | OUTPATIENT
Start: 2024-07-22 | End: 2024-07-22

## 2024-07-22 RX ORDER — LIDOCAINE HYDROCHLORIDE 10 MG/ML
5 INJECTION, SOLUTION INFILTRATION; PERINEURAL
Status: COMPLETED | OUTPATIENT
Start: 2024-07-22 | End: 2024-07-22

## 2024-07-22 RX ADMIN — TRIAMCINOLONE ACETONIDE 40 MG: 40 INJECTION, SUSPENSION INTRA-ARTICULAR; INTRAMUSCULAR at 16:20

## 2024-07-22 RX ADMIN — LIDOCAINE HYDROCHLORIDE 5 ML: 10 INJECTION, SOLUTION INFILTRATION; PERINEURAL at 16:20

## 2024-07-22 NOTE — PROGRESS NOTES
"Chief Complaint  Follow-up of the Left Knee and Follow-up of the Left Leg     Subjective      Jessika Mccann presents to [facility] for follow up of the left knee and leg.  She has pain in the left thigh and in the knee.  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  She has difficulty with prolonged standing, ambulation and stairs.      No Known Allergies     Social History     Socioeconomic History    Marital status: Single   Tobacco Use    Smoking status: Never    Smokeless tobacco: Never   Vaping Use    Vaping status: Never Used        I reviewed the patient's chief complaint, history of present illness, review of systems, past medical history, surgical history, family history, social history, medications, and allergy list.     Review of Systems     Constitutional: Denies fevers, chills, weight loss  Cardiovascular: Denies chest pain, shortness of breath  Skin: Denies rashes, acute skin changes  Neurologic: Denies headache, loss of consciousness        Vital Signs:   /95   Pulse 83   Ht 165.1 cm (65\")   Wt 86.2 kg (190 lb)   SpO2 97%   BMI 31.62 kg/m²          Physical Exam  General: Alert. No acute distress    Ortho Exam        LEFT KNEE Flexion 110. Extension 0. Stable to varus/valgus stress. Stable to anterior/posterior drawer. Neurovascularly intact. Pain with Jeffrey. Negative Lachman. Positive EHL, FHL, HS and TA. Sensation intact to light touch all 5 nerves of the foot. Ambulates with Antalgic gait. Patella is well tracking. Calf supple, non-tender. Positive tenderness to the medial joint line. Positive tenderness to the lateral joint line. Negative Crepitus. Good strength to hamstrings, quadriceps, dorsiflexors, and plantar flexors.  Knee Extensor Mechanism intact    Large Joint: L knee  Date/Time: 7/22/2024 4:20 PM  Consent given by: patient  Site marked: site marked  Timeout: Immediately prior to procedure a time out was called to verify " the correct patient, procedure, equipment, support staff and site/side marked as required   Supporting Documentation  Indications: pain   Procedure Details  Location: knee - L knee  Preparation: Patient was prepped and draped in the usual sterile fashion  Needle gauge: 21G.  Medications administered: 5 mL lidocaine 1 %; 40 mg triamcinolone acetonide 40 MG/ML  Patient tolerance: patient tolerated the procedure well with no immediate complications    This injection documentation was Scribed for Johsua Forrest MD by Pascale Hamilton CMA.  07/22/24   16:20 EDT       Imaging Results (Most Recent)       None             Result Review :          Assessment and Plan     Diagnoses and all orders for this visit:    1. Left knee pain, unspecified chronicity (Primary)    2. Osteoarthritis of left knee, unspecified osteoarthritis type        Discussed the treatment plan with the patient.     Discussed the risks and benefits of conservative measures. The patient expressed understanding and wished to proceed with  a left knee steroid injection.  She tolerated the injection well.     Call or return if worsening symptoms.    Follow Up     PRN      Patient was given instructions and counseling regarding her condition or for health maintenance advice. Please see specific information pulled into the AVS if appropriate.     Scribed for Joshua Forrest MD by Mabel Dunn MA.  07/22/24   16:15 EDT      I have personally performed the services described in this document as scribed by the above individual and it is both accurate and complete. Joshua Forrest MD 07/22/24

## 2024-07-23 ENCOUNTER — TELEPHONE (OUTPATIENT)
Dept: ORTHOPEDIC SURGERY | Facility: CLINIC | Age: 64
End: 2024-07-23
Payer: COMMERCIAL

## 2024-07-23 NOTE — TELEPHONE ENCOUNTER
PATIENT WAS SEEN ON 7/22 AT 4PM AND FORGOT TO ASK FOR A WORK NOTE, WAS SEEN FOR LEFT KNEE PAIN AND IS TO FOLLOW UP IN 3 MONTHS FOR POSSIBLY ANOTHER INJECTION.   NEED WORK NOTE FOR WORK COMP STATING FULL DUTY NO RESTRICTION OR IF RESTRICTIONS NEEDED  THANKS

## 2024-07-24 ENCOUNTER — TELEPHONE (OUTPATIENT)
Dept: ORTHOPEDIC SURGERY | Facility: CLINIC | Age: 64
End: 2024-07-24
Payer: COMMERCIAL

## 2024-10-02 ENCOUNTER — TRANSCRIBE ORDERS (OUTPATIENT)
Dept: ADMINISTRATIVE | Facility: HOSPITAL | Age: 64
End: 2024-10-02
Payer: COMMERCIAL

## 2024-10-02 DIAGNOSIS — Z12.31 VISIT FOR SCREENING MAMMOGRAM: Primary | ICD-10-CM

## 2024-10-25 ENCOUNTER — HOSPITAL ENCOUNTER (OUTPATIENT)
Dept: MAMMOGRAPHY | Facility: HOSPITAL | Age: 64
Discharge: HOME | End: 2024-10-25
Admitting: SURGERY
Payer: COMMERCIAL

## 2024-10-25 DIAGNOSIS — Z12.31 SCREENING MAMMOGRAM, ENCOUNTER FOR: ICD-10-CM

## 2024-10-25 PROCEDURE — 77067 SCR MAMMO BI INCL CAD: CPT

## 2024-10-25 PROCEDURE — 77063 BREAST TOMOSYNTHESIS BI: CPT

## 2024-10-30 ENCOUNTER — OFFICE VISIT (OUTPATIENT)
Dept: ORTHOPEDIC SURGERY | Facility: CLINIC | Age: 64
End: 2024-10-30
Payer: COMMERCIAL

## 2024-10-30 VITALS
HEART RATE: 65 BPM | BODY MASS INDEX: 29.49 KG/M2 | WEIGHT: 177 LBS | OXYGEN SATURATION: 92 % | HEIGHT: 65 IN | SYSTOLIC BLOOD PRESSURE: 130 MMHG | DIASTOLIC BLOOD PRESSURE: 74 MMHG

## 2024-10-30 DIAGNOSIS — M17.12 OSTEOARTHRITIS OF LEFT KNEE, UNSPECIFIED OSTEOARTHRITIS TYPE: ICD-10-CM

## 2024-10-30 DIAGNOSIS — M25.562 LEFT KNEE PAIN, UNSPECIFIED CHRONICITY: Primary | ICD-10-CM

## 2024-10-30 RX ORDER — TRIAMCINOLONE ACETONIDE 40 MG/ML
40 INJECTION, SUSPENSION INTRA-ARTICULAR; INTRAMUSCULAR
Status: COMPLETED | OUTPATIENT
Start: 2024-10-30 | End: 2024-10-30

## 2024-10-30 RX ORDER — LIDOCAINE HYDROCHLORIDE 10 MG/ML
5 INJECTION, SOLUTION EPIDURAL; INFILTRATION; INTRACAUDAL; PERINEURAL
Status: COMPLETED | OUTPATIENT
Start: 2024-10-30 | End: 2024-10-30

## 2024-10-30 RX ADMIN — LIDOCAINE HYDROCHLORIDE 5 ML: 10 INJECTION, SOLUTION EPIDURAL; INFILTRATION; INTRACAUDAL; PERINEURAL at 09:03

## 2024-10-30 RX ADMIN — TRIAMCINOLONE ACETONIDE 40 MG: 40 INJECTION, SUSPENSION INTRA-ARTICULAR; INTRAMUSCULAR at 09:03

## 2024-10-30 NOTE — PROGRESS NOTES
"Chief Complaint  Follow-up of the Left Knee     Subjective      Jessika Mccann presents to [facility] for follow up of the left knee.  She has pain in the left knee and thigh.  She wears a knee sleeve at times.  She has pain in the hip at times.  She has pain with prolonged standing, ambulation and stairs.  She had a left knee steroid injection was 7/22/24.  She noted that helped the pain for awhile.  She has had increase pain the last few weeks.     No Known Allergies     Social History     Socioeconomic History    Marital status: Single   Tobacco Use    Smoking status: Never    Smokeless tobacco: Never   Vaping Use    Vaping status: Never Used        I reviewed the patient's chief complaint, history of present illness, review of systems, past medical history, surgical history, family history, social history, medications, and allergy list.     Review of Systems     Constitutional: Denies fevers, chills, weight loss  Cardiovascular: Denies chest pain, shortness of breath  Skin: Denies rashes, acute skin changes  Neurologic: Denies headache, loss of consciousness        Vital Signs:   /74   Pulse 65   Ht 165.1 cm (65\")   Wt 80.3 kg (177 lb)   SpO2 92%   BMI 29.45 kg/m²          Physical Exam  General: Alert. No acute distress    Ortho Exam        LEFT KNEE Flexion 110. Extension 0. Stable to varus/valgus stress. Stable to anterior/posterior drawer. Neurovascularly intact. Pain with Jeffrey. Negative Lachman. Positive EHL, FHL, HS and TA. Sensation intact to light touch all 5 nerves of the foot. Ambulates with Antalgic gait. Patella is well tracking. Calf supple, non-tender. Positive tenderness to the medial joint line. Positive tenderness to the lateral joint line. Negative Crepitus. Good strength to hamstrings, quadriceps, dorsiflexors, and plantar flexors.  Knee Extensor Mechanism intact       Large Joint: L knee  Date/Time: 10/30/2024 9:03 AM  Consent given by: " patient  Site marked: site marked  Timeout: Immediately prior to procedure a time out was called to verify the correct patient, procedure, equipment, support staff and site/side marked as required   Supporting Documentation  Indications: pain   Procedure Details  Location: knee - L knee  Preparation: Patient was prepped and draped in the usual sterile fashion  Needle gauge: 21 G.  Medications administered: 40 mg triamcinolone acetonide 40 MG/ML; 5 mL lidocaine PF 1% 1 %  Patient tolerance: patient tolerated the procedure well with no immediate complications      This injection documentation was Scribed for Joshua Forrest MD by Cece Bowen MA.  10/30/24   09:04 EDT     Imaging Results (Most Recent)       None             Result Review :                    Assessment and Plan     Diagnoses and all orders for this visit:    1. Left knee pain, unspecified chronicity (Primary)    2. Osteoarthritis of left knee, unspecified osteoarthritis type        Discussed the treatment plan with the patient.     Discussed the risks and benefits of conservative measures. The patient expressed understanding and wished to proceed with a left knee steroid injection.  She tolerated the injection well.     Discussed the treatment options with the patient, operative vs non-operative. The patient is a candidate for a left total knee arthroplasty whenever she is ready.      Work note given.     Call or return if worsening symptoms.    Follow Up     PRN      Patient was given instructions and counseling regarding her condition or for health maintenance advice. Please see specific information pulled into the AVS if appropriate.     Scribed for Joshua Forrest MD by Mbael Dunn MA.  10/30/24   08:36 EDT    I have personally performed the services described in this document as scribed by the above individual and it is both accurate and complete. Joshua Forrest MD 10/30/24

## 2024-11-26 ENCOUNTER — TELEPHONE (OUTPATIENT)
Dept: ORTHOPEDIC SURGERY | Facility: CLINIC | Age: 64
End: 2024-11-26

## 2024-11-26 NOTE — TELEPHONE ENCOUNTER
Caller: JOLLY DAMARIS    Phone Number: [phone] (home)     Reason for Call:   PATIENT WOULD LIKE  A COPY OF PAPERWORK MAILED TO HER ADDRESS ON FILE.

## 2024-11-26 NOTE — TELEPHONE ENCOUNTER
Caller: JOLLY   Relationship to Patient:SELF  Phone Number: [phone]  Reason for Call: PATIENT CALLING ASKING TO SPEAK TO ANTONIO SHE STATES THAT THE SECTION OF  THE PART OF HER [place] PAPERWORK IS MISSING THE INTERMITTED PART HER JOB IS REQUIRING HER TO FILL THIS PART OUT

## 2024-12-09 ENCOUNTER — HOSPITAL ENCOUNTER (OUTPATIENT)
Dept: MAMMOGRAPHY | Facility: HOSPITAL | Age: 64
Discharge: HOME | End: 2024-12-09
Admitting: FAMILY MEDICINE
Payer: COMMERCIAL

## 2024-12-09 DIAGNOSIS — Z12.31 VISIT FOR SCREENING MAMMOGRAM: ICD-10-CM

## 2024-12-09 PROCEDURE — 77063 BREAST TOMOSYNTHESIS BI: CPT

## 2024-12-09 PROCEDURE — 77067 SCR MAMMO BI INCL CAD: CPT

## 2024-12-13 ENCOUNTER — TRANSCRIBE ORDERS (OUTPATIENT)
Dept: ADMINISTRATIVE | Facility: HOSPITAL | Age: 64
End: 2024-12-13
Payer: COMMERCIAL

## 2024-12-13 DIAGNOSIS — R92.8 ABNORMAL MAMMOGRAM: Primary | ICD-10-CM

## 2025-01-27 ENCOUNTER — HOSPITAL ENCOUNTER (OUTPATIENT)
Dept: MAMMOGRAPHY | Facility: HOSPITAL | Age: 65
Discharge: HOME | End: 2025-01-27
Payer: COMMERCIAL

## 2025-01-27 ENCOUNTER — HOSPITAL ENCOUNTER (OUTPATIENT)
Dept: ULTRASOUND IMAGING | Facility: HOSPITAL | Age: 65
Discharge: HOME | End: 2025-01-27
Payer: COMMERCIAL

## 2025-01-27 DIAGNOSIS — R92.8 ABNORMAL MAMMOGRAM: ICD-10-CM

## 2025-01-27 PROCEDURE — 77065 DX MAMMO INCL CAD UNI: CPT

## 2025-01-27 PROCEDURE — 76642 ULTRASOUND BREAST LIMITED: CPT

## 2025-01-27 PROCEDURE — G0279 TOMOSYNTHESIS, MAMMO: HCPCS

## 2025-06-09 ENCOUNTER — TRANSCRIBE ORDERS (OUTPATIENT)
Dept: OCCUPATIONAL THERAPY | Facility: HOSPITAL | Age: 65
End: 2025-06-09
Payer: COMMERCIAL

## 2025-06-09 DIAGNOSIS — I89.0 LYMPHEDEMA: Primary | ICD-10-CM

## 2025-06-09 DIAGNOSIS — C50.911 MALIGNANT NEOPLASM OF RIGHT FEMALE BREAST, UNSPECIFIED ESTROGEN RECEPTOR STATUS, UNSPECIFIED SITE OF BREAST: ICD-10-CM

## 2025-06-30 ENCOUNTER — OFFICE VISIT (OUTPATIENT)
Dept: OTOLARYNGOLOGY | Facility: CLINIC | Age: 65
End: 2025-06-30
Payer: COMMERCIAL

## 2025-06-30 VITALS
HEART RATE: 82 BPM | SYSTOLIC BLOOD PRESSURE: 142 MMHG | DIASTOLIC BLOOD PRESSURE: 75 MMHG | OXYGEN SATURATION: 95 % | TEMPERATURE: 97.5 F

## 2025-06-30 DIAGNOSIS — H61.21 IMPACTED CERUMEN OF RIGHT EAR: ICD-10-CM

## 2025-06-30 DIAGNOSIS — H92.03 OTALGIA OF BOTH EARS: Primary | ICD-10-CM

## 2025-06-30 DIAGNOSIS — L29.9 EAR ITCHING: ICD-10-CM

## 2025-06-30 PROCEDURE — 99204 OFFICE O/P NEW MOD 45 MIN: CPT | Performed by: OTOLARYNGOLOGY

## 2025-06-30 PROCEDURE — 69210 REMOVE IMPACTED EAR WAX UNI: CPT | Performed by: OTOLARYNGOLOGY

## 2025-06-30 RX ORDER — LISINOPRIL 10 MG/1
1 TABLET ORAL DAILY
COMMUNITY
Start: 2025-04-16

## 2025-06-30 RX ORDER — SIMVASTATIN 40 MG
40 TABLET ORAL
COMMUNITY
Start: 2025-06-16

## 2025-06-30 RX ORDER — FLUTICASONE PROPIONATE 50 MCG
SPRAY, SUSPENSION (ML) NASAL
COMMUNITY
Start: 2025-05-19

## 2025-06-30 RX ORDER — ALBUTEROL SULFATE 90 UG/1
INHALANT RESPIRATORY (INHALATION)
COMMUNITY
Start: 2025-04-21

## 2025-06-30 RX ORDER — MECLIZINE HYDROCHLORIDE 25 MG/1
1 TABLET ORAL 3 TIMES DAILY
COMMUNITY
Start: 2025-04-16

## 2025-06-30 RX ORDER — METFORMIN HYDROCHLORIDE 500 MG/1
1 TABLET, EXTENDED RELEASE ORAL DAILY
COMMUNITY
Start: 2025-06-04

## 2025-06-30 RX ORDER — LEVOTHYROXINE SODIUM 100 UG/1
1 TABLET ORAL DAILY
COMMUNITY
Start: 2025-06-12

## 2025-06-30 RX ORDER — CITALOPRAM HYDROBROMIDE 20 MG/1
1 TABLET ORAL DAILY
COMMUNITY
Start: 2025-04-16

## 2025-06-30 RX ORDER — ANASTROZOLE 1 MG/1
1 TABLET ORAL DAILY
COMMUNITY
Start: 2025-06-17

## 2025-06-30 RX ORDER — AZELASTINE 1 MG/ML
SPRAY, METERED NASAL
COMMUNITY
Start: 2025-06-14

## 2025-06-30 RX ORDER — FAMOTIDINE 40 MG/1
40 TABLET, FILM COATED ORAL
COMMUNITY
Start: 2025-06-06

## 2025-06-30 RX ORDER — VERAPAMIL HYDROCHLORIDE 300 MG/1
CAPSULE, EXTENDED RELEASE ORAL
COMMUNITY
Start: 2025-06-20

## 2025-06-30 RX ORDER — LEVOTHYROXINE SODIUM 50 UG/1
1 TABLET ORAL DAILY
COMMUNITY
Start: 2025-06-23

## 2025-06-30 RX ORDER — GABAPENTIN 300 MG/1
CAPSULE ORAL
COMMUNITY
Start: 2025-04-30

## 2025-06-30 RX ORDER — ROSUVASTATIN CALCIUM 5 MG/1
1 TABLET, COATED ORAL DAILY
COMMUNITY
Start: 2025-04-16

## 2025-06-30 RX ORDER — DILTIAZEM HYDROCHLORIDE 60 MG/1
2 TABLET, FILM COATED ORAL 2 TIMES DAILY
COMMUNITY
Start: 2025-06-02

## 2025-06-30 RX ORDER — FLUOCINOLONE ACETONIDE 0.11 MG/ML
OIL AURICULAR (OTIC)
Qty: 20 ML | Refills: 6 | Status: SHIPPED | OUTPATIENT
Start: 2025-06-30

## 2025-06-30 RX ORDER — VALSARTAN 160 MG/1
1 TABLET ORAL DAILY
COMMUNITY
Start: 2025-06-27

## 2025-06-30 RX ORDER — EPINEPHRINE 0.3 MG/.3ML
INJECTION SUBCUTANEOUS
COMMUNITY
Start: 2025-03-12

## 2025-06-30 RX ORDER — PANTOPRAZOLE SODIUM 40 MG/1
1 TABLET, DELAYED RELEASE ORAL DAILY
COMMUNITY
Start: 2025-04-16

## 2025-06-30 RX ORDER — ESOMEPRAZOLE MAGNESIUM 40 MG/1
1 CAPSULE, DELAYED RELEASE ORAL DAILY
COMMUNITY
Start: 2025-06-12

## 2025-06-30 NOTE — PROGRESS NOTES
Patient Name: Rosie Villarreal   Visit Date: 06/30/2025   Patient ID: [id number]  Provider: Fahad Riley MD    Sex: female  [place] [facility]   YOB: 1960  Location Address: [address] [place] [address], [address] [place] [address],?[place]?[id number]    Primary Care Provider Susanna Galarza MD  Location Phone: [phone]    Referring Provider: Susanna Galarza MD        Chief Complaint  Earache    History of Present Illness  Rosie Villarreal is a 64 y.o. female who presents to [facility] today as a consult from Susanna Galarza MD for evaluation of her ears.  She tells me that for quite some time she has experienced intermittent difficulty with cerumen impactions and otalgia.  She reports left greater than right achy otalgia which will often radiate to her head.  It seems to come and go throughout the day and will often last for weeks at a time.  She has not noticed any obvious triggers but will occasionally be associated with an upper respiratory tract infection.  She does report occasional rhinorrhea and sneezing which she attributes to allergies.  These seem to be worse in the summer.  She will occasionally use saline irrigations.  She denies any otorrhea, tinnitus, vertigo, prior otologic trauma, or prior otologic surgery.  She does report a history of hearing loss for which she has an appointment at [facility] in August.  She does report a history of bruxism but is not using a nightguard.  She also mentions frequent ear itching and often uses hair pins to scratch her itching ears.  They are currently feeling well today.    History reviewed. No pertinent past medical history.    Past Surgical History:   Procedure Laterality Date    ANKLE SURGERY Left     BREAST LUMPECTOMY Right 2001    CARPAL TUNNEL RELEASE      GALLBLADDER SURGERY           Current Outpatient Medications:     albuterol sulfate  (90 Base) MCG/ACT inhaler, INHALE TWO PUFFS BY  MOUTH EVERY 4 TO 6 HOURS, Disp: , Rfl:     anastrozole (ARIMIDEX) 1 MG tablet, Take 1 tablet by mouth Daily., Disp: , Rfl:     azelastine (ASTELIN) 0.1 % nasal spray, PLACE TWO SPRAYS IN EACH NOSTRIL TWICE DAILY, Disp: , Rfl:     citalopram (CeleXA) 20 MG tablet, Take 1 tablet by mouth Daily., Disp: , Rfl:     EPINEPHrine (EPIPEN) 0.3 MG/0.3ML solution auto-injector injection, INJECT INTO THE MIDDLE OF THE OUTER THIGH AND HOLD FOR 10 SECONDS AS NEEDED FOR SEVERE ALLERGIC REACTION THEN CALL 911 IF USED, Disp: , Rfl:     esomeprazole (nexIUM) 40 MG capsule, Take 1 capsule by mouth Daily., Disp: , Rfl:     famotidine (PEPCID) 40 MG tablet, Take 1 tablet by mouth every night at bedtime., Disp: , Rfl:     fluticasone (FLONASE) 50 MCG/ACT nasal spray, instill TWO SPRAYS IN EACH NOSTRIL EVERY DAY, Disp: , Rfl:     gabapentin (NEURONTIN) 300 MG capsule, , Disp: , Rfl:     levothyroxine (SYNTHROID, LEVOTHROID) 100 MCG tablet, Take 1 tablet by mouth Daily., Disp: , Rfl:     levothyroxine (SYNTHROID, LEVOTHROID) 50 MCG tablet, Take 1 tablet by mouth Daily., Disp: , Rfl:     lisinopril (PRINIVIL,ZESTRIL) 10 MG tablet, Take 1 tablet by mouth Daily., Disp: , Rfl:     meclizine (ANTIVERT) 25 MG tablet, Take 1 tablet by mouth 3 times a day., Disp: , Rfl:     metFORMIN ER (GLUCOPHAGE-XR) 500 MG 24 hr tablet, Take 1 tablet by mouth Daily., Disp: , Rfl:     pantoprazole (PROTONIX) 40 MG EC tablet, Take 1 tablet by mouth Daily., Disp: , Rfl:     rosuvastatin (CRESTOR) 5 MG tablet, Take 1 tablet by mouth Daily., Disp: , Rfl:     simvastatin (ZOCOR) 40 MG tablet, Take 1 tablet by mouth every night at bedtime., Disp: , Rfl:     Symbicort 80-4.5 MCG/ACT inhaler, Inhale 2 puffs 2 (Two) Times a Day., Disp: , Rfl:     valsartan (DIOVAN) 160 MG tablet, Take 1 tablet by mouth Daily., Disp: , Rfl:     verapamil (VERELAN) 300 MG capsule sustained-release 24 hr 24 hr capsule, TAKE ONE CAPSULE BY MOUTH EVERY DAY (AT BEDTIME), Disp: , Rfl:      "fluocinolone acetonide (DERMOTIC) 0.01 % oil otic oil, 5 drops to both ears twice daily x 14 days, Disp: 20 mL, Rfl: 6     Allergies   Allergen Reactions    Penicillins Other (See Comments)     \"Passed out\"       Social History     Tobacco Use    Smoking status: Never    Smokeless tobacco: Never        Objective     Vital Signs:   /75   Pulse 82   Temp 97.5 °F (36.4 °C)   SpO2 95%       Physical Exam    General: Well developed, well nourished patient of stated age in no acute distress. Voice is strong and clear.   Head: Normocephalic and atraumatic.  Face: No lesions.  Bilateral parotid and submandibular glands are unremarkable.  Stensen's and Warthin's ducts are productive of clear saliva bilaterally.  House-Brackmann I/VI     bilaterally.  Left-sided masseter insertion tenderness to palpation.  TMJ crepitus.  Eyes: PERRLA, sclerae anicteric, no conjunctival injection. Extraocular movements are intact and full. No nystagmus.   Ears: Auricles are normal in appearance.  Left external auditory canal with desiccated skin.  Right external auditory canal is impacted with cerumen.  This was removed using a working otoscope and alligator forceps.. Bilateral tympanic membranes are clear and without effusion. Hearing normal to conversational voice.   Nose: External nose is normal in appearance. Bilateral nares are patent with normal appearing mucosa. Septum midline. Turbinates are unremarkable. No lesions.   Oral Cavity: Lips are normal in appearance. Oral mucosa is unremarkable. Gingiva is unremarkable. Normal dentition for age. Tongue is unremarkable with good movement. Hard palate is unremarkable.   Oropharynx: Soft palate is unremarkable with full movement. Uvula is unremarkable. Bilateral tonsils are unremarkable. Posterior oropharynx is unremarkable.    Larynx and hypopharynx: Deferred secondary to gag reflex.  Neck: Supple.  No mass.  Nontender to palpation.  Trachea midline.  Thyroidectomy scar " present.   Lymphatic: No lymphadenopathy upon palpation.  Respiratory: Clear to auscultation bilaterally, nonlabored respirations    Cardiovascular: RRR, no murmurs, rubs, or gallops,   Psychiatric: Appropriate affect, cooperative   Neurologic: Oriented x 3, strength symmetric in all extremities, Cranial Nerves II-XII are grossly intact to confrontation   Skin: Warm and dry. No rashes.    Procedures           Result Review :               Assessment and Plan    Diagnoses and all orders for this visit:    1. Otalgia of both ears (Primary)    2. Ear itching  -     fluocinolone acetonide (DERMOTIC) 0.01 % oil otic oil; 5 drops to both ears twice daily x 14 days  Dispense: 20 mL; Refill: 6    3. Impacted cerumen of right ear    Impressions and findings were discussed at great length.  Currently, she is seen for evaluation of frequent bilateral otalgia which is often intermittent throughout the day.  She has not noticed any aggravating or alleviating factors.  Examination today revealed a right-sided cerumen impaction which was debrided and dry external auditory canal skin.  Her middle ears appear clear but she is mildly tender to palpation of her left masseter insertion.  We discussed my concern that her otalgia may be secondary to myofascial muscle pain/TMJ although we discussed the remainder of the differential.  As her ears are feeling normal today we discussed seeing her back when she is acutely symptomatic.  We did discuss conservative measures for management of myofascial muscle pain/TMJ.  In regards to her ear itching we discussed avoiding sticking anything in her ear canals and she will also be tried on fluocinolone otic.  I have asked her to bring her upcoming audiogram with her when she follows up.  She was given ample time to ask questions, all of which were answered to her satisfaction.        Follow Up   Return in about 18 weeks (around 11/3/2025).  Patient was given instructions and counseling regarding  her condition or for health maintenance advice. Please see specific information pulled into the AVS if appropriate.

## 2025-07-31 NOTE — PROGRESS NOTES
Patient Name: Rosie Villarreal   Visit Date: 08/01/2025   Patient ID: [id number]  Provider: SOY Bazzi    Sex: female  [place] [facility]   YOB: 1960  Location Address: [address] [place] [address], [address],?[place]?[address]    Primary Care Provider Susanna Galarza MD  Location Phone: [phone]    Referring Provider: No ref. provider found        Chief Complaint  (R) ear pain    History of Present Illness  Rosie Villarreal is a 64 y.o. female who presents to [facility] for (R) ear pain  Previously seen by Dr. Riley with last office visit being on 6/30/2025.  Has a history of hearing loss and is seen at [facility].  Says a history of otalgia as well as bruxism without use of a nightguard.  Also complains of intermittent ear itching.  6/30/2025  Patient placed on DermOtic oil for ear itching.  Regarding her bilateral otalgia, Dr. Riley discussed TMJ/myofascial muscle pain with her.  History of Present Illness  The patient presents for right ear and jaw pain.    She began experiencing pain in her right ear last Friday, which she believes may be related to a popping sensation in her jaw. The pain is severe enough to prevent her from chewing. She also mentions that she has difficulty sleeping due to the discomfort when lying on her side. She has not been prescribed any muscle relaxants for this issue. She has a history of similar issues with her left jaw.  Additionally, she complains of bilateral ear itching.        Vital Signs:  Vitals:    08/01/25 1151   BP: 132/71   Pulse: 68   Temp: 97.5 °F (36.4 °C)   SpO2: 94%        Past Medical History:   Diagnosis Date    HL (hearing loss)        Past Surgical History:   Procedure Laterality Date    ANKLE SURGERY Left     BREAST LUMPECTOMY Right 2001    CARPAL TUNNEL RELEASE      GALLBLADDER SURGERY           Current Outpatient Medications:     Calcium Citrate-Vitamin D3 (CITRACAL) 315-6.25  MG-MCG tablet tablet, Take 1 tablet by mouth Daily., Disp: , Rfl:     citalopram (CeleXA) 20 MG tablet, Take 1 tablet by mouth Daily., Disp: , Rfl:     famotidine (PEPCID) 40 MG tablet, Take 1 tablet by mouth every night at bedtime., Disp: , Rfl:     fluocinolone acetonide (DERMOTIC) 0.01 % oil otic oil, 5 drops to both ears twice daily x 14 days, Disp: 20 mL, Rfl: 6    fluticasone (FLONASE) 50 MCG/ACT nasal spray, instill TWO SPRAYS IN EACH NOSTRIL EVERY DAY, Disp: , Rfl:     levothyroxine (SYNTHROID, LEVOTHROID) 50 MCG tablet, Take 1 tablet by mouth Daily., Disp: , Rfl:     lisinopril (PRINIVIL,ZESTRIL) 10 MG tablet, Take 1 tablet by mouth Daily., Disp: , Rfl:     magnesium chloride ER 64 MG DR tablet, Take 2 tablets by mouth Daily., Disp: , Rfl:     meclizine (ANTIVERT) 25 MG tablet, Take 1 tablet by mouth 3 times a day., Disp: , Rfl:     metFORMIN ER (GLUCOPHAGE-XR) 500 MG 24 hr tablet, Take 1 tablet by mouth Daily., Disp: , Rfl:     pantoprazole (PROTONIX) 40 MG EC tablet, Take 1 tablet by mouth Daily., Disp: , Rfl:     Potassium 99 MG tablet, Take 1 tablet by mouth Daily., Disp: , Rfl:     rosuvastatin (CRESTOR) 5 MG tablet, Take 1 tablet by mouth Daily., Disp: , Rfl:     vitamin B-6 (PYRIDOXINE) 50 MG tablet, Take 2 tablets by mouth Daily., Disp: , Rfl:     vitamin C (ASCORBIC ACID) 250 MG tablet, Take 2 tablets by mouth Daily., Disp: , Rfl:     vitamin D3 125 MCG (5000 UT) capsule capsule, Take 2 capsules by mouth Daily., Disp: , Rfl:     albuterol sulfate  (90 Base) MCG/ACT inhaler, INHALE TWO PUFFS BY MOUTH EVERY 4 TO 6 HOURS, Disp: , Rfl:     anastrozole (ARIMIDEX) 1 MG tablet, Take 1 tablet by mouth Daily., Disp: , Rfl:     azelastine (ASTELIN) 0.1 % nasal spray, PLACE TWO SPRAYS IN EACH NOSTRIL TWICE DAILY, Disp: , Rfl:     cyclobenzaprine (FLEXERIL) 10 MG tablet, Take 1 tablet by mouth Every Night for 30 days., Disp: 30 tablet, Rfl: 2    diclofenac (VOLTAREN) 50 MG EC tablet, Take 1 tablet by  "mouth 2 (Two) Times a Day As Needed (TMJ pain) for up to 14 days., Disp: 28 tablet, Rfl: 0    EPINEPHrine (EPIPEN) 0.3 MG/0.3ML solution auto-injector injection, INJECT INTO THE MIDDLE OF THE OUTER THIGH AND HOLD FOR 10 SECONDS AS NEEDED FOR SEVERE ALLERGIC REACTION THEN CALL 911 IF USED, Disp: , Rfl:     esomeprazole (nexIUM) 40 MG capsule, Take 1 capsule by mouth Daily., Disp: , Rfl:     gabapentin (NEURONTIN) 300 MG capsule, , Disp: , Rfl:     levothyroxine (SYNTHROID, LEVOTHROID) 100 MCG tablet, Take 1 tablet by mouth Daily., Disp: , Rfl:     simvastatin (ZOCOR) 40 MG tablet, Take 1 tablet by mouth every night at bedtime., Disp: , Rfl:     Symbicort 80-4.5 MCG/ACT inhaler, Inhale 2 puffs 2 (Two) Times a Day., Disp: , Rfl:     valsartan (DIOVAN) 160 MG tablet, Take 1 tablet by mouth Daily., Disp: , Rfl:     verapamil (VERELAN) 300 MG capsule sustained-release 24 hr 24 hr capsule, TAKE ONE CAPSULE BY MOUTH EVERY DAY (AT BEDTIME), Disp: , Rfl:      Allergies   Allergen Reactions    Penicillins Other (See Comments)     \"Passed out\"       Social History     Tobacco Use    Smoking status: Never    Smokeless tobacco: Never        Objective     Tobacco Use: Low Risk  (8/1/2025)    Patient History     Smoking Tobacco Use: Never     Smokeless Tobacco Use: Never     Passive Exposure: Not on file         Physical Exam    Constitutional   Appearance  well developed, well-nourished, alert and in no acute distress, voice clear and strong    Head   Inspection  no deformities or lesions, atraumatic    Face   Inspection  no facial lesions; House-Brackmann I/VI bilaterally   Palpation  no TMJ crepitus , the patient has significant right masseter muscle and TMJ joint tenderness exacerbated by opening the mouth  Eyes/Vision   Visual Fields  extraocular movements are intact, no spontaneous or gaze-induced nystagmus  Conjunctivae  clear   Sclerae  clear   Pupils and Irises  pupils equal, round, and reactive to light.   Nystagmus  not " present     Ears, Nose, Mouth and Throat  Ears  External Ears  Auricles appearance within normal limits, no lesions present   Otoscopic Examination  tympanic membrane appearance within normal limits bilaterally without perforations, well-aerated middle ears without evidence of effusion  Hearing  intact to conversational voice both ears   Tunning fork testing    Rinne:  [place]:    Nose  External Nose  appearance normal   Intranasal Exam  mucosa within normal limits, vestibules normal, no intranasal lesions present, septum midline, sinuses non tender to percussion   Modified Luis Test:    Oral Cavity  Oral Mucosa  oral mucosa normal without pallor or cyanosis   Stensen's and Warthin's ducts are productive and patent with clear saliva  Lips  lip appearance normal   Teeth  normal dentition for age   Gums  gums pink, non-swollen, no bleeding present   Tongue  tongue appearance normal; normal mobility   Palate  hard palate normal, soft palate appearance normal with symmetric mobility     Throat  Oropharynx  no inflammation or lesions present  Tonsils  Bilateral tonsils unremarkable  Hypopharynx  appearance within normal limits   Larynx  voice normal     Neck  Inspection/Palpation  normal appearance, no masses or tenderness, trachea midline; thyroid size normal, nontender, no nodules or masses present on palpation     Lymphatic  Neck  no lymphadenopathy present   Supraclavicular Nodes  no lymphadenopathy present   Preauricular Nodes  no lymphadenopathy present     Respiratory  Respiratory Effort  breathing unlabored   Inspection of Chest  normal appearance, no retractions     Musculoskeletal   Cervical back: Normal range of motion and neck supple.      Skin and Subcutaneous Tissue  General Inspection  Regarding face and neck - there are no rashes present, no lesions present, and no areas of discoloration     Neurologic  Cranial Nerves  Alert and oriented x3  cranial nerves II-XII are grossly intact bilaterally   Gait and  "Station  normal gait, able to stand without diffculty    Psychiatric  Judgement and Insight  judgment and insight intact   Mood and Affect  mood normal, affect appropriate       RESULTS REVIEWED    I have reviewed the following information:   [x]  Previous Internal Note  []  Previous External Note:   [x]  Ordered Tests & Results:      Pathology: No results found for: \"MICRO\"    No results found for: \"TSH\", \"T3FREE\", \"FREET4\", \"PTH\", \"THYROGLB\", \"CALCIUM\", \"WHKD29AI\", \"THYRSTIMIMMU\", \"THYROIDAB\"    No Images in the past 120 days found..      I have discussed the interpretation of the above results with the patient.    Procedures          Assessment and Plan   Diagnoses and all orders for this visit:    1. TMJ tenderness, right (Primary)  -     cyclobenzaprine (FLEXERIL) 10 MG tablet; Take 1 tablet by mouth Every Night for 30 days.  Dispense: 30 tablet; Refill: 2  -     diclofenac (VOLTAREN) 50 MG EC tablet; Take 1 tablet by mouth 2 (Two) Times a Day As Needed (TMJ pain) for up to 14 days.  Dispense: 28 tablet; Refill: 0    2. Acute otalgia, right  -     cyclobenzaprine (FLEXERIL) 10 MG tablet; Take 1 tablet by mouth Every Night for 30 days.  Dispense: 30 tablet; Refill: 2  -     diclofenac (VOLTAREN) 50 MG EC tablet; Take 1 tablet by mouth 2 (Two) Times a Day As Needed (TMJ pain) for up to 14 days.  Dispense: 28 tablet; Refill: 0        Assessment & Plan  1. Temporomandibular joint (TMJ) disorder  The TMJ disorder is causing right ear pain and difficulty chewing. The pain radiates to the ear, but the ear itself is in good condition. Flexeril 10 mg was prescribed, with instructions to start with half a tablet at night to monitor for excessive morning drowsiness. Diclofenac was also prescribed, to be taken twice daily as needed for 2 weeks. The patient was advised to rest the jaw, apply a cold compress, and gently massage the area. She should avoid chewing gum and hard foods for a couple of weeks until the flare-up " subsides. If there is no improvement within a week, she should contact the office, and an x-ray will be ordered.    2.  Ear itching.  Recommend over-the-counter 1% hydrocortisone applied to external ear canal twice daily x 1 week for itching as needed.    The risks, benefits, and alternatives of treatment were discussed. The benefits of Flexeril include muscle relaxation and reduction of jaw clenching, while potential risks include drowsiness. Diclofenac, a high-dose NSAID, can reduce inflammation and pain but may cause stomach irritation. Steroids were considered but not recommended due to potential blood sugar fluctuations. Non-pharmacological options such as rest, cold compress, and dietary modifications were also discussed. The patient was informed to contact the office if symptoms do not improve within a week for further evaluation, including the possibility of an x-ray.      (M26.621) TMJ tenderness, right - Plan: cyclobenzaprine (FLEXERIL) 10 MG tablet, diclofenac (VOLTAREN) 50 MG EC tablet    (H92.01) Acute otalgia, right - Plan: cyclobenzaprine (FLEXERIL) 10 MG tablet, diclofenac (VOLTAREN) 50 MG EC tablet     Rosie Villarreal  reports that she has never smoked. She has never used smokeless tobacco.       Plan:  Patient Instructions   TEMPOROMANDIBULAR JOINT EXERCISES  The temporomandibular joint, or the TMJ, is the jaw joint. This joint can frequently be a source of pain in the head and neck region. Typically because of its location, pain is felt either in area just in front of the ear, or in the ear itself. However, pain in the TMJ can be referred to any part of the head and neck.     An examination by the physician frequently reveals tenderness around the joint. Oftentimes, a crack or pop can also be felt. This may be an indication that the pain is in all actuality coming from the joint. An exhaustive search for a cause is carried out, in an effort to determine the etiology of the pain. Pain can be caused  by grinding of teeth at night (bruxism), overuse (gum chewing, ice cracking, over opening mouth), poor dentition and malocclusion (bad bite). In an attempt to be thorough, your physician may involve others who have experience in this field, such as oral surgeons, dentists and pain experts.     Should you be diagnosed with TMJ pain, a course of conservative treatment is indicated, as this works in an overwhelming majority of cases. Because this pain originates from a joint, the treatment is similar to treatment for pain in other joints.  Treatment:  -Rest:  This is indicated to relieve the pressure on the joint. No chewing gum, tough meat, hard bread, cracking ice in the teeth, or any other activity that places undue stress on the joint. Simply, if it causes it to hurt, stop doing it. This may be required for an unspecified period of time, usually 1 to 2 weeks.  -Cold to the area:  This will reduce swelling and pain early in the course.  -Heat to the area:  This improves blood flow to the area and speeds healing.  -Pain Relievers: Anti-inflammatory medications, such as aspirin, Motrin, Advil, Nuprin, Aleve or any other products in this category are satisfactory to use in the face of joint pain. Rarely are narcotics required, except possibly in the acute phase to gain some initial relief.    Recommendations:  ?       Reduce/eliminate caffeinated drinks  ?       Reduce high sugar drinks and foods  ?       Get plenty of rest  ?       Practice relaxation techniques; Yoga, Biofeedback, Honorio Chi, etc.  ?       Exercise for overall fitness  ?       Eat healthy- High fiber, low fat/cholesterol eating, and change eating habits. We recommend Sugar Busters as a lifestyle change for eating.  ?       See your dentist regularly for checkups and bite checks.  Rehabilitation:  -Once the acute pain has been controlled, you should begin exercises to strengthen and rehabilitate the TMJ.  -Exercises: These should be performed for five  minutes at least five times each day  -Slowly open the mouth to its fullest extent, even using the fingers to exert gentle pressure.  -Open and close the mouth as widely and rapidly as possible.  -Open and close the mouth against resistance by placing the open palm underneath the chin, or the fingers on top of the chin.  -Move the lower jaw side to side without resistance. Later, add resistance by placing both hands on either side of the jaw.  -Push (protrude) the lower jaw without resistance. Later add resistance.     Should the above regimen fail to lead to improvement, your physician will discuss with you other forms of therapy. Since almost all types of TMJ pain respond to conservative therapy, surgery is indicated only after exhausting the rehabilitation.        Follow Up   No follow-ups on file.  Patient was given instructions and counseling regarding her condition or for health maintenance advice. Please see specific information pulled into the AVS if appropriate.      Patient or patient representative verbalized consent for the use of Ambient Listening during the visit with  SOY Bazzi for chart documentation. 8/1/2025  12:52 EDT    All or a portion of this Note was dictated utilizing Dragon Dictation.

## 2025-08-01 ENCOUNTER — OFFICE VISIT (OUTPATIENT)
Dept: OTOLARYNGOLOGY | Facility: CLINIC | Age: 65
End: 2025-08-01
Payer: COMMERCIAL

## 2025-08-01 VITALS
OXYGEN SATURATION: 94 % | TEMPERATURE: 97.5 F | SYSTOLIC BLOOD PRESSURE: 132 MMHG | HEART RATE: 68 BPM | DIASTOLIC BLOOD PRESSURE: 71 MMHG

## 2025-08-01 DIAGNOSIS — M26.621 TMJ TENDERNESS, RIGHT: Primary | ICD-10-CM

## 2025-08-01 DIAGNOSIS — H92.01 ACUTE OTALGIA, RIGHT: ICD-10-CM

## 2025-08-01 PROCEDURE — 99214 OFFICE O/P EST MOD 30 MIN: CPT

## 2025-08-01 RX ORDER — MULTIVIT WITH MINERALS/LUTEIN
500 TABLET ORAL DAILY
COMMUNITY

## 2025-08-01 RX ORDER — THIAMINE HCL 100 MG
1 TABLET ORAL DAILY
COMMUNITY

## 2025-08-01 RX ORDER — LANOLIN ALCOHOL/MO/W.PET/CERES
100 CREAM (GRAM) TOPICAL DAILY
COMMUNITY

## 2025-08-01 RX ORDER — CYCLOBENZAPRINE HCL 10 MG
10 TABLET ORAL NIGHTLY
Qty: 30 TABLET | Refills: 2 | Status: SHIPPED | OUTPATIENT
Start: 2025-08-01 | End: 2025-08-31

## 2025-08-01 NOTE — PATIENT INSTRUCTIONS
TEMPOROMANDIBULAR JOINT EXERCISES  The temporomandibular joint, or the TMJ, is the jaw joint. This joint can frequently be a source of pain in the head and neck region. Typically because of its location, pain is felt either in area just in front of the ear, or in the ear itself. However, pain in the TMJ can be referred to any part of the head and neck.     An examination by the physician frequently reveals tenderness around the joint. Oftentimes, a crack or pop can also be felt. This may be an indication that the pain is in all actuality coming from the joint. An exhaustive search for a cause is carried out, in an effort to determine the etiology of the pain. Pain can be caused by grinding of teeth at night (bruxism), overuse (gum chewing, ice cracking, over opening mouth), poor dentition and malocclusion (bad bite). In an attempt to be thorough, your physician may involve others who have experience in this field, such as oral surgeons, dentists and pain experts.     Should you be diagnosed with TMJ pain, a course of conservative treatment is indicated, as this works in an overwhelming majority of cases. Because this pain originates from a joint, the treatment is similar to treatment for pain in other joints.  Treatment:  -Rest:  This is indicated to relieve the pressure on the joint. No chewing gum, tough meat, hard bread, cracking ice in the teeth, or any other activity that places undue stress on the joint. Simply, if it causes it to hurt, stop doing it. This may be required for an unspecified period of time, usually 1 to 2 weeks.  -Cold to the area:  This will reduce swelling and pain early in the course.  -Heat to the area:  This improves blood flow to the area and speeds healing.  -Pain Relievers: Anti-inflammatory medications, such as aspirin, Motrin, Advil, Nuprin, Aleve or any other products in this category are satisfactory to use in the face of joint pain. Rarely are narcotics required, except possibly in  the acute phase to gain some initial relief.    Recommendations:  ?       Reduce/eliminate caffeinated drinks  ?       Reduce high sugar drinks and foods  ?       Get plenty of rest  ?       Practice relaxation techniques; Yoga, Biofeedback, Honorio Chi, etc.  ?       Exercise for overall fitness  ?       Eat healthy- High fiber, low fat/cholesterol eating, and change eating habits. We recommend Sugar Busters as a lifestyle change for eating.  ?       See your dentist regularly for checkups and bite checks.  Rehabilitation:  -Once the acute pain has been controlled, you should begin exercises to strengthen and rehabilitate the TMJ.  -Exercises: These should be performed for five minutes at least five times each day  -Slowly open the mouth to its fullest extent, even using the fingers to exert gentle pressure.  -Open and close the mouth as widely and rapidly as possible.  -Open and close the mouth against resistance by placing the open palm underneath the chin, or the fingers on top of the chin.  -Move the lower jaw side to side without resistance. Later, add resistance by placing both hands on either side of the jaw.  -Push (protrude) the lower jaw without resistance. Later add resistance.     Should the above regimen fail to lead to improvement, your physician will discuss with you other forms of therapy. Since almost all types of TMJ pain respond to conservative therapy, surgery is indicated only after exhausting the rehabilitation.